# Patient Record
Sex: MALE | Race: WHITE
[De-identification: names, ages, dates, MRNs, and addresses within clinical notes are randomized per-mention and may not be internally consistent; named-entity substitution may affect disease eponyms.]

---

## 2018-09-30 ENCOUNTER — HOSPITAL ENCOUNTER (EMERGENCY)
Dept: HOSPITAL 41 - JD.ED | Age: 38
Discharge: HOME | End: 2018-09-30
Payer: COMMERCIAL

## 2018-09-30 VITALS — DIASTOLIC BLOOD PRESSURE: 108 MMHG | SYSTOLIC BLOOD PRESSURE: 139 MMHG

## 2018-09-30 DIAGNOSIS — I10: ICD-10-CM

## 2018-09-30 DIAGNOSIS — F17.210: ICD-10-CM

## 2018-09-30 DIAGNOSIS — F41.9: ICD-10-CM

## 2018-09-30 DIAGNOSIS — F32.9: ICD-10-CM

## 2018-09-30 DIAGNOSIS — M51.37: ICD-10-CM

## 2018-09-30 DIAGNOSIS — Z88.1: ICD-10-CM

## 2018-09-30 DIAGNOSIS — M47.26: Primary | ICD-10-CM

## 2018-09-30 PROCEDURE — 72131 CT LUMBAR SPINE W/O DYE: CPT

## 2018-09-30 PROCEDURE — 99284 EMERGENCY DEPT VISIT MOD MDM: CPT

## 2018-09-30 PROCEDURE — 96361 HYDRATE IV INFUSION ADD-ON: CPT

## 2018-09-30 PROCEDURE — 96375 TX/PRO/DX INJ NEW DRUG ADDON: CPT

## 2018-09-30 PROCEDURE — 96374 THER/PROPH/DIAG INJ IV PUSH: CPT
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
## 2018-10-01 NOTE — CT
CT lumbar spine

 

Technique: Multiple axial sections were obtained from the mid disc 

level of T11-T12 inferiorly to the bottom of the S3 sacral segment.  

Reconstructed sagittal and coronal images were reviewed.

 

Comparison: No prior lumbar spine imaging.

 

Findings: Nonobstructing calculus is partially seen within the left 

kidney.  Liver shows fatty infiltration.

 

T11-T12: Disc is incompletely seen.  Posterior disc maintains a 

concave margin.  No central canal stenosis or neural foraminal 

stenosis is seen.

 

T12-L1: Posterior disc maintains a concave margin.  No central canal 

stenosis or neural foraminal stenosis is seen.

 

L1-L2: Posterior disc is preserved.  No central canal stenosis or 

neural foraminal stenosis is seen.

 

L2-L3: Posterior disc maintains a concave margin.  No central canal 

stenosis or neural foraminal stenosis is seen.

 

L3-L4: Posterior disc maintains a concave margin.  No central canal 

stenosis or neural foraminal stenosis is seen.

 

L4-L5: Minimal circumferential disc bulge is seen.  Posterior disc 

maintains a planar margin.  No central canal stenosis or neural 

foraminal stenosis is seen.

 

L5-S1: Mild posterior disc bulge is seen asymmetric to the right side.

  Disc bulge slightly touches the right S1 nerve root.  Neural 

foramina are patent where the nerve roots exit.  Minimal degenerative 

apophyseal change is seen.

 

No fracture or abnormal subluxation is seen.

 

Impression:

1.  Slight degenerative change as noted above most prominent at L5-S1 

with minimal disc bulge slightly touching the right S1 nerve root.

2.  Nothing acute is seen.

3.  Other incidental findings as noted above.

 

Diagnostic code #3

 

I agree with preliminary report issued by [facility] ([facility] report finalized 

on 09/30/18, 10:17 PM Central Time)
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
## 2020-04-19 NOTE — US
Abdominal ultrasound: Multiple real-time images of the abdomen were 

obtained.

 

Liver is generous in size and appears echogenic most likely due to 

fatty infiltration.  No definite focal abnormality is appreciated 

within the liver.  Gallbladder is filled with sludge.  No shadowing 

gallstones, gallbladder wall thickening or biliary duct dilatation is 

seen.  Pancreas appears to be a scattered in echogenicity.  Possible 

hypoechoic mass within the body of the pancreas is noted measuring 2.2

 cm.  No additional abnormalities are appreciated within the 

visualized pancreas.  Kidneys show no hydronephrosis or mass.  Right 

kidney length is 11.4 cm and left kidney length is 12.3 cm.  Aorta 

shows no aneurysm.  Inferior vena cava not visualized due to bowel 

gas.  Spleen size is normal.  Portal vein shows normal hepatopedal 

flow.

 

Impression:

1.  Abnormal pancreas suggesting the possibility of chronic 

pancreatitis.  Questionable mass within the mid pancreas measuring 2.2

 cm.  3 phase contrast enhanced CT of the pancreas is recommended to 

further evaluate.

2.  Sludge within the gallbladder.

3.  Fatty infiltration within the liver with mild hepatomegaly.

 

Diagnostic code #9

 

This report was dictated in MDT

 

I agree with preliminary report from [facility], finalized on 04/18/20, 8:02

 PM [employer] Daylight Time

## 2020-04-19 NOTE — PCM.PN
- General Info


Date of Service: 04/19/20


Subjective Update: 





Feeling ok 


Slept ok


Thirsty


No pain








- Patient Data


Vitals - Most Recent: 


 Last Vital Signs











Temp  98.2 F   04/19/20 07:38


 


Pulse  109 H  04/19/20 07:38


 


Resp  14   04/19/20 07:38


 


BP  114/84   04/19/20 07:38


 


Pulse Ox  98   04/19/20 07:38











Weight - Most Recent: 70.942 kg





- Exam


Quality Assessment: No: Supplemental Oxygen, Central Line/PICC, Urine Catheter


General: Alert, Oriented, Cooperative.  No: No Acute Distress


HEENT: Pupils Equal, Pupils Reactive, Scleral Icterus


Neck: Supple, Trachea Midline, No JVD, No Thyromegaly, +2 Carotid Pulse wo Bruit


Lungs: Clear to Auscultation, Normal Respiratory Effort.  No: Decreased Breath 

Sounds, Crackles, Rales, Rhonchi, Rub, Wheezing


Cardiovascular: Regular Rate, Regular Rhythm.  No: Murmurs, Gallops, Rubs


GI/Abdominal Exam: Normal Bowel Sounds, Mass, Hepatomegaly (appear to extend 

under umbilicus, about 5cm below rib cage).  No: Distended, Guarding, Rigid


Skin: Warm, Dry, Intact





Sepsis Event Note





- Evaluation


Sepsis Screening Result: Severe Sepsis Risk





- Focused Exam


Vital Signs: 


 Vital Signs











  Temp Pulse Resp BP Pulse Ox


 


 04/19/20 07:38  98.2 F  109 H  14  114/84  98


 


 04/19/20 04:44  98.2 F  104 H  20  120/88  98











Date Exam was Performed: 04/19/20


Time Exam was Performed: 17:54





- Problem List & Annotations


(1) Alcoholic hepatitis


SNOMED Code(s): 875307201


   Code(s): K70.10 - ALCOHOLIC HEPATITIS WITHOUT ASCITES   Status: Acute   

Current Visit: Yes   





(2) Hepatomegaly


SNOMED Code(s): 27809207


   Code(s): R16.0 - HEPATOMEGALY, NOT ELSEWHERE CLASSIFIED   Status: Acute   

Current Visit: Yes   





(3) Macrocytic anemia


SNOMED Code(s): 66347387


   Code(s): D53.9 - NUTRITIONAL ANEMIA, UNSPECIFIED   Status: Acute   Current 

Visit: Yes   





(4) Thrombocytopenia


SNOMED Code(s): 738408356


   Code(s): D69.6 - THROMBOCYTOPENIA, UNSPECIFIED   Status: Acute   Current 

Visit: Yes   





(5) Hypokalemia


SNOMED Code(s): 95257603


   Code(s): E87.6 - HYPOKALEMIA   Status: Acute   Current Visit: Yes   





(6) Hypoalbuminemia


SNOMED Code(s): 179452023


   Code(s): E88.09 - [facility] DISORDERS OF PLASMA-PROTEIN METABOLISM, NEC   Status: 

Acute   Current Visit: Yes   





(7) Alcohol abuse


SNOMED Code(s): 29244460


   Code(s): F10.10 - ALCOHOL ABUSE, UNCOMPLICATED   Status: Acute   Current 

Visit: Yes   





(8) Hyponatremia


SNOMED Code(s): 40322848


   Code(s): E87.1 - HYPO-OSMOLALITY AND HYPONATREMIA   Status: Acute   Current 

Visit: Yes   





(9) Anxiety


SNOMED Code(s): 23971854


   Code(s): F41.9 - ANXIETY DISORDER, UNSPECIFIED   Status: Acute   Current 

Visit: No   





(10) Depression


SNOMED Code(s): 62585764


   Code(s): F32.9 - MAJOR DEPRESSIVE DISORDER, SINGLE EPISODE, UNSPECIFIED   

Status: Acute   Current Visit: No   





(11) Hypomagnesemia


SNOMED Code(s): 352581091


   Code(s): E83.42 - HYPOMAGNESEMIA   Status: Acute   Current Visit: Yes   





(12) Current every day smoker


SNOMED Code(s): 493555664, 963360373


   Code(s): F17.200 - NICOTINE DEPENDENCE, UNSPECIFIED, UNCOMPLICATED   Status: 

Acute   Current Visit: Yes   





(13) Pancreatic mass


SNOMED Code(s): 525500931


   Code(s): K86.89 - OTHER SPECIFIED DISEASES OF PANCREAS   Status: Acute   

Current Visit: Yes   





(14) Hypophosphatemia


SNOMED Code(s): 5790579


   Code(s): E83.39 - OTHER DISORDERS OF PHOSPHORUS METABOLISM   Status: Acute   

Current Visit: Yes   





(15) Marijuana smoker


SNOMED Code(s): 784673136


   Code(s): F12.90 - CANNABIS USE, UNSPECIFIED, UNCOMPLICATED   Status: Acute   

Current Visit: Yes   





- Problem List Review


Problem List Initiated/Reviewed/Updated: Yes





- Plan


Plan:: 





DAY OF ADMISSION


- Ethanol level zero on admission + binge alcohol drinking pattern


   - Admitted with [facility] protocol


- No need for steroid use due to Maddrey score of 8


- Negative Tylenol and salicylate level


- Positive UDS for THC


- Normal coagulation studies, no need for vitamin K


- Normal kidney function


- Smoked 0.5ppd


- Depression


   - Worsening depression as per patient


   - Previously treated with paroxetine, he discontinued due to sexual 

dysfunction


   - Denies any suicidal or homicidal ideation


   - Denies any previous psychiatric admissions





DAY 1


- Evaluated by psychiatry 


   - Recommended initiating Seroquel, Topamax, Prozac and Ativan as needed


   - Outpatient rehabilitation


- Complete abdominal US with mas in pancreatic head recommending triple phase 

CT 


- Electrolytes continue to be abnormal, will replace


- Liver function tests still significantly elevate but trending down


- Patient remains NPO


- Afebrile


- CT imaging with pancreatic head mass possibly in ampulla of vater


Case discussed with [facility] in [place], [place]--> 


States that the findings on pancreatic head appear to be same density of the 

rest of the pancreas and does not appear to be a mas


There is some stranding around the pancreas which represents acute edema and 

stated patient likely is having acute pancreatitis without pain


Recommended outpatient endoscopic ultrasound 4 weeks after resolution of 

current clinical picture





Acute alcoholic hepatitis


Alcohol abuse, binge pattern


Hepatomegaly


- NS + 40KCl


- Repeat labs in AM


- Strict urine output monitoring


- Thiamine and folic acid supplementation


- Banana bag in AM


- CIWA protocol


- Hepatitis panel, pending


- Continue NPO status


- Daily lipase and amylase





Hypokalemia


Hypophosphatemia


Hyponatremia


- KCl 40mEq IV


- NS + 40mEq at 150 ml/hr


- Sodium phosphate 60mMol 





Current every day smoker


- Nicotine patch 14mg





Depression


- Start thiamine, folic acid


- Start Seroquel


- Start Topamax


- Start Prozac





Hypoalbuminemia


Weight loss


- Dietary consult





Hypomagnesemia, resolved





PROPHYLAXIS


DVT- compression stockings


GI- pantoprazole





CODE STATUS: FULL CODE





DISPOSITION:


Patient will be admitted for supportive care and monitorization of liver 

function. 





SOCIAL:


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

## 2020-04-20 NOTE — CONS
CONSULTING PHYSICIAN:  Saad Francisco MD

DATE OF CONSULTATION:  04/19/2020

 

Site where the services are provided are [facility] in [place], [place].

 

Site where the services are provided are offices in [facility].

 

Length of service for this 60-minute inpatient telemedicine event is 60 minutes.

 

IDENTIFICATION:

The patient is a 39-year-old male who is admitted to the inpatient Med/Surg Unit

at [facility] in [place], [place].  He is seen for

psychiatric consultation per the request of staff attending, Dr. Lyles, in

our treatment team.

 

CHIEF COMPLAINT:

"I wanna lyons and drink like a fish.  I don't know why the hell I do drink

sometimes."

 

HISTORY OF PRESENT ILLNESS:

The patient is a 39-year-old male who is admitted to the inpatient med/surg unit

for complications of hepatitis caused by excessive alcohol use.  He states that

he has a history of binge drinking, and he states that for the last 6 weeks, he

has been drinking "about 10 to 12 shots a day" of hard liquor.  He states he has

been doing that again for 6 weeks and he states that he had been doing well,

"but it all started when I stopped taking my paroxetine" back in September.  The

patient states that he had been on the Paxil medication for about 6 months.  "I

had not been drinking", but he stopped the medication because he was having some

sexual side effects from the medication.  He states also "I'm kind of quick

tempered" and reports mood swings.  He states the Paxil helped a little bit with

that too, but since he stopped the Paxil medication, started to get pretty

depressed, pretty irritable, and he has been drinking again pretty heavily.  He

states he has lack of interest, lack of sleep, where he only gets about 3 hours

of sleep per 24-hour period and "if I'm christie, maybe 4 hours."  Reports lack of

appetite.  He states that he has racing thoughts, ruminations, worries,

"constantly worrying," and just feels pretty anxious too.  He is denying any

suicide or homicide.  He denies any psychotic, delusional, or paranoid symptoms.

He denies any illicit substance use complicating his clinical picture.  He

states that he wants to stop drinking and he states "I just need to get on a

medication again" to help him with his mood.  He does report decent energy

levels in general.

 

MEDICATIONS:

At time of presentation, none.

 

ALLERGIES:

Cephalexin.

 

PAST MEDICAL HISTORY:

1. Lower back pain.

2. History of alcoholic hepatitis.

 

REVIEW OF SYSTEMS:

Aside from musculoskeletal and hepatic, all other major organ systems are

negative at this point in time for acute difficulties or complications.

 

FAMILY PSYCHIATRIC HISTORY:

The patient reports sister who has a history of alcoholism.

 

PAST PSYCHIATRIC AND CD HISTORY:

The patient denies any previous psychiatric hospitalizations.  Reports one

chemical dependency treatment in the past.  He has not been to AA.  He states

his longest sobriety has been for 1 year.  He denies any previous suicide

attempts, self-injurious behaviors, or eating disorder history.

 

PAST PSYCHIATRIC DIAGNOSIS:

Includes clinical depression.

 

PAST PSYCHIATRIC MEDICATION HISTORY:

Includes Paxil.  He was taking 20 mg for 6 months at work, but he quit secondary

to sexual side effects, and he also had Ativan in the past which helped him.

The patient sees a neurologist out [facility] named Dr. Thrasher.

 

SOCIAL HISTORY:

The patient was born in [place], [place].  Raised in [place], [place].  He is the 4th of 4 siblings and 3 older sisters.  The patient's parents

were  throughout his childhood and adolescence.  Father was a johnson and

a rancher.  Mother worked on a ranch and stayed home with the family.  The

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

also doing farming and ranching.  He has been  once,  once, is in

a current relationship for about 5 years.  He has a 2-year-old child from the

relationship and 4 stepchildren.  His girlfriend works in a truck shop.  He

lives in [place], [place], with his girlfriend and children.  Denies any

prior  service or any current legal difficulties.  He is Evangelical in

terms of his danilo formation.  However, he is not feeling so depressed.  He

enjoys playing golf, hunting, fishing, and camping.

 

MENTAL STATUS EXAM:

The patient is a 39-year-old white male in no apparent distress.  Speech is of

regular rate and rhythm.  The patient is cognitively oriented.  Psychomotor

activity is within normal limits.  There are no abnormal motor movements or tics

observed.  Gait and station are not observed.  This patient is lying in bed

during the course of the inpatient consult.  Mood is depressed and anxious.

Affect is consistent with stated mood, restricted, but cooperative overall for

the purposes of the inpatient consult.  There is no behavioral or stated

evidence of acute suicidal or homicidal ideation or acute psychotic, delusional,

or paranoid symptoms.  Thought processes are significant for racing thoughts or

ruminations.  There are no manic symptoms, loose associations evident.  Judgment

and insight appear unimpaired at this point in time.  Motivation for help

appears good.

 

VITAL SIGNS:  114/84, 109, 14, 98.2 degrees.

 

IMPRESSION:

Axis I:

1. Alcohol dependence, F10.20.

2. Bipolar affective disease, mixed type, F31.60.

3. Anxiety disorder, not otherwise specified, F41.9.

4. Rule out major depressive disorder.

Axis II:  None.

Axis III:

1. History of alcoholic hepatitis.

2. History of lower back pain.

Axis IV:  Severe.

Axis V:  55.

 

PLAN:

1. Sobriety.

2. AA rep to visit the patient while on unit.

3. Pastoral guidance.

4. Begin trial of Seroquel 25 mg at bedtime to help with clarity of thought,

    mood stability, and reduction of racing thoughts, ruminations, as well as

    sleep initiation and maintenance, anxiety reduction, and elimination of any

    psychotic symptoms.

5. Begin Topamax 25 mg b.i.d. for mood stability, anxiety reduction, and

    seizure prophylaxis.

6. Begin Prozac 20 mg q.a.m. for symptoms of depression.

7. Folic acid supplementation while on unit.

8. Thiamine supplementation while on unit.

9. Ativan per [facility] protocol.

10.Recommend that the patient follow up with Outpatient Psychiatry when

    medically stabilized and discharged back to community to assess his overall

    function and efficacy of his newly initiated psychiatric medication

    regimen.

11.The patient is apprised of benefits and side effects of his newly initiated

    psychiatric medication regimen.  He acknowledges understanding of these

    facts.  He has no further questions by the end of the interview session.

12.The patient is unable to maintain sobriety on his own after being

    discharged back to the community.  Would recommend some type of structured

    chemical dependency treatment program, either on outpatient or inpatient

    basis moving forward.

13.We will continue to follow up with the patient on an as-needed basis while

    he remains on the inpatient med/surg unit at [facility] in

    [place], [place].

14.We will follow up with the patient sooner if any complications in the

    interim.

15.Crisis plan is in place.

 

DD:  04/20/2020 06:58:05

DT:  04/20/2020 08:30:33  NHAN

Job #:  [id number]

## 2020-04-20 NOTE — PCM.PN
- General Info


Date of Service: 04/20/20


Subjective Update: 





Feeling OK


Slept OK


No pain 


No complaints








- Patient Data


Vitals - Most Recent: 


 Last Vital Signs











Temp  98.1 F   04/20/20 11:35


 


Pulse  105 H  04/20/20 16:09


 


Resp  16   04/20/20 16:09


 


BP  126/92 H  04/20/20 16:09


 


Pulse Ox  97   04/20/20 16:09











Weight - Most Recent: 72.756 kg





- Exam


Physical Findings Comments:: 





Quality Assessment: No: Supplemental Oxygen, Central Line/PICC, Urine Catheter


General: Alert, Oriented, Cooperative.  No: No Acute Distress


HEENT: Pupils Equal, Pupils Reactive, Scleral Icterus


Neck: Supple, Trachea Midline, No JVD, No Thyromegaly, +2 Carotid Pulse wo Bruit


Lungs: Clear to Auscultation, Normal Respiratory Effort.  No: Decreased Breath 

Sounds, Crackles, Rales, Rhonchi, Rub, Wheezing


Cardiovascular: Regular Rate, Regular Rhythm.  No: Murmurs, Gallops, Rubs


GI/Abdominal Exam: Normal Bowel Sounds, Mass, Hepatomegaly (appear to extend 

under umbilicus, about 5cm below rib cage).  No: Distended, Guarding, Rigid


Skin: Warm, Dry, Intact





Sepsis Event Note





- Evaluation


Sepsis Screening Result: No Definite Risk





- Focused Exam


Vital Signs: 


 Vital Signs











  Temp Pulse Resp BP Pulse Ox


 


 04/20/20 16:09   105 H  16  126/92 H  97


 


 04/20/20 11:35  98.1 F  103 H  16  129/91 H  98


 


 04/20/20 08:22  97.3 F  97  16  133/91 H  98











Date Exam was Performed: 04/21/20


Time Exam was Performed: 11:57





- Problem List & Annotations


(1) Alcoholic hepatitis


SNOMED Code(s): 877580740


   Code(s): K70.10 - ALCOHOLIC HEPATITIS WITHOUT ASCITES   Status: Acute   

Current Visit: Yes   





(2) Hepatomegaly


SNOMED Code(s): 09430307


   Code(s): R16.0 - HEPATOMEGALY, NOT ELSEWHERE CLASSIFIED   Status: Acute   

Current Visit: Yes   





(3) Macrocytic anemia


SNOMED Code(s): 95426128


   Code(s): D53.9 - NUTRITIONAL ANEMIA, UNSPECIFIED   Status: Acute   Current 

Visit: Yes   





(4) Thrombocytopenia


SNOMED Code(s): 997841085


   Code(s): D69.6 - THROMBOCYTOPENIA, UNSPECIFIED   Status: Acute   Current 

Visit: Yes   





(5) Hypokalemia


SNOMED Code(s): 21613775


   Code(s): E87.6 - HYPOKALEMIA   Status: Acute   Current Visit: Yes   





(6) Hypoalbuminemia


SNOMED Code(s): 372321365


   Code(s): E88.09 - OTH DISORDERS OF PLASMA-PROTEIN METABOLISM, NEC   Status: 

Acute   Current Visit: Yes   





(7) Alcohol abuse


SNOMED Code(s): 51240276


   Code(s): F10.10 - ALCOHOL ABUSE, UNCOMPLICATED   Status: Acute   Current 

Visit: Yes   





(8) Hyponatremia


SNOMED Code(s): 88021324


   Code(s): E87.1 - HYPO-OSMOLALITY AND HYPONATREMIA   Status: Acute   Current 

Visit: Yes   





(9) Anxiety


SNOMED Code(s): 90998672


   Code(s): F41.9 - ANXIETY DISORDER, UNSPECIFIED   Status: Acute   Current 

Visit: No   





(10) Depression


SNOMED Code(s): 28191422


   Code(s): F32.9 - MAJOR DEPRESSIVE DISORDER, SINGLE EPISODE, UNSPECIFIED   

Status: Acute   Current Visit: No   





(11) Hypomagnesemia


SNOMED Code(s): 311399825


   Code(s): E83.42 - HYPOMAGNESEMIA   Status: Acute   Current Visit: Yes   





(12) Current every day smoker


SNOMED Code(s): 629335754, 058438631


   Code(s): F17.200 - NICOTINE DEPENDENCE, UNSPECIFIED, UNCOMPLICATED   Status: 

Acute   Current Visit: Yes   





(13) Pancreatic mass


SNOMED Code(s): 882886778


   Code(s): K86.89 - OTHER SPECIFIED DISEASES OF PANCREAS   Status: Acute   

Current Visit: Yes   





(14) Hypophosphatemia


SNOMED Code(s): 8233730


   Code(s): E83.39 - OTHER DISORDERS OF PHOSPHORUS METABOLISM   Status: Acute   

Current Visit: Yes   





(15) Marijuana smoker


SNOMED Code(s): 261520436


   Code(s): F12.90 - CANNABIS USE, UNSPECIFIED, UNCOMPLICATED   Status: Acute   

Current Visit: Yes   





- Problem List Review


Problem List Initiated/Reviewed/Updated: Yes





- Plan


Plan:: 





DAY OF ADMISSION


- Ethanol level zero on admission + binge alcohol drinking pattern


   - Admitted with CIWA protocol


- No need for steroid use due to Maddrey score of 8


- Negative Tylenol and salicylate level


- Positive UDS for THC


- Normal coagulation studies, no need for vitamin K


- Normal kidney function


- Smoked 0.5ppd


- Depression


   - Worsening depression as per patient


   - Previously treated with paroxetine, he discontinued due to sexual 

dysfunction


   - Denies any suicidal or homicidal ideation


   - Denies any previous psychiatric admissions





DAY 1


- Evaluated by psychiatry 


   - Recommended initiating Seroquel, Topamax, Prozac and Ativan as needed


   - Outpatient rehabilitation


- Complete abdominal US with mas in pancreatic head recommending triple phase 

CT 


- Electrolytes continue to be abnormal, will replace


- Liver function tests still significantly elevate but trending down


- Patient remains NPO


- Afebrile


- CT imaging with pancreatic head mass possibly in ampulla of vater


Case discussed with [facility] in [place], [place]--> 


States that the findings on pancreatic head appear to be same density of the 

rest of the pancreas and does not appear to be a mas


There is some stranding around the pancreas which represents acute edema and 

stated patient likely is having acute pancreatitis without pain


Recommended outpatient endoscopic ultrasound 4 weeks after resolution of 

current clinical picture





DAY 2


- LFTs trending down


- Pancreatic enzymes trending down


- No pain


- Is hungry


- Afebrile


- CIWA negative





Acute alcoholic hepatitis


Alcohol abuse, binge pattern


Hepatomegaly


- NS + 40KCl


- Repeat labs in AM


- Strict urine output monitoring


- Thiamine and folic acid supplementation


- [facility] protocol


- Hepatitis panel, pending


- Continue NPO status


- Daily lipase and amylase





Hypokalemia


Hypophosphatemia


Hyponatremia


- KCl 40mEq IV


- NS + 40mEq at 150 ml/hr


- Sodium phosphate 60mMol 





Current every day smoker


- Nicotine patch 14mg





Depression


- Continue Seroquel, Topamax and Prozac





Hypoalbuminemia


Weight loss


- Dietary consult





Hypomagnesemia, resolved





PROPHYLAXIS


DVT- compression stockings


GI- pantoprazole





CODE STATUS: FULL CODE





DISPOSITION:


Patient will remain admitted for supportive care and monitorization of liver 

function. 


Diet will be started once enzymes are less than x3 upper limit of normal


Will need discharge to rehab facility





SOCIAL:


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

## 2020-04-20 NOTE — CT
CT abdomen and pelvis



Technique: Multiple axial sections were obtained as a 3 phase study through the 
pancreas.  Noncontrast imaging was obtained as well as arterial phase imaging 
and venous phase imaging through the pancreas.  5 minute delayed images were 
also obtained through the pancreas.



Findings: Liver is enlarged and shows diffuse fatty infiltration.



Visualized lung bases show nothing acute.



Adrenal glands show no nodule.  Kidneys show symmetric contrast enhancement 
without hydronephrosis.  No mass is appreciated within the kidneys.  5 minute 
delayed images show contrast excretion into both ureters which show no 
dilatation.  There is mild bowel wall thickening within portions of the colon.  
Slight inflammatory change is noted around the hepatic flexure of the colon.  
Findings are suspicious for nonspecific colitis.



Pancreas shows no abnormal calcifications.  Size of the pancreas is within 
normal limits for age.  Focal area of poor enhancement on the arterial phase 
noted within the head and uncinate process of the pancreas.  This finding 
measures 2.1 cm.  This finding is near the ampulla of Vater.  No additional 
abnormality is seen within the pancreas.



Scattered small lymph nodes are seen within the retroperitoneum believed to be 
within normal limits.



Bone window settings were reviewed.  No acute osseous finding is appreciated.



Impression:

1.  Findings suspicious for nonspecific colitis as described above.

2.  Focal area of poor enhancement within the head and uncinate process of the 
pancreas.  This could represent focal area of pancreatitis as well as a small 
mass which occurs near the ampulla of Vater.  Mass is felt more likely.  
Recommend referral to a gastroenterologist for opinion whether biopsy by ERCP 
can be performed.  This finding would be difficult to biopsy by percutaneous 
technique.  Also, please correlate if patient has elevated amylase and lipase 
to indicate pancreatitis.

3.  Diffuse fatty infiltration within the liver with hepatomegaly.



Diagnostic code #9



This report was dictated in MDT

MTDD

## 2020-04-21 NOTE — PCM.SN
- Free Text/Narrative


Note: 





The patient is a 39-year-old gentleman who was admitted initially for acute 

alcohol hepatitis.  Physician was called to the patient's room secondary to his 

severe agitation, disorientation and hallucinations.  According to nursing 

staff the patient's hallucinations and agitation gotten worse over the past 

several hours.  He was initially placed back on CIWA protocol he had been given 

3 milligrams of Ativan.  The patient will also be continued on CIWA protocol.  

On examination the patient was disoriented to place and time and he was able to 

repeat his name.  Also, the patient was clearly hallucinating.  The patient was 

also continuing to try and get out of bed.  It was felt that the patient is at 

higher risk for seizure activity and unintentional self-harm requiring one-on-

one sitter and he was moved to intensive care unit.  The patient also had 

ordered to have 5 mg of Haldol IM to help with his agitation.  This felt that 

the patient's acute mental status changes representative of alcoholic 

encephalopathy.  He also be continued on IV fluids as well as thiamine and 

folate.





TeleHealth





- TeleHealth


Patient Service Facility: [facility]: [facility]


Informed Consent: 


Telemedicine Audio/Visual Informed Consent: The risks, benefits, and 

alternatives to the telehealth visit were explained to the patient and the 

patient consented to this modality of care.  The telehealth visit was carried 

out via a secure, web-based conferencing system.  This telemedicine service was 

a real-time, two-way interactive video and communication between the patient 

and the provider.  All the parties involved were identified and approved by the 

patient prior to the visit.  Any physical exam was assisted by the patient.  

Unless noted otherwise, the provider was located at their usual clinic location

, and the patient was at their place of residence.  Patient identity was 

confirmed by having the patient state their name and date of birth.  All 

communications with the patient (verbal, audiovisual, and written) were 

documented in the patients medical record per documentation standards.

## 2020-04-21 NOTE — PCM.PN
- General Info


Date of Service: 20


Subjective Update: 


39-year-old male admitted for alcoholic hepatitis became severely agitated, 

disoriented, and had hallucinations early this morning/late last night.  

Patient was given Ativan and Haldol.  Patient was transferred to the ICU for 

closer observation and one-on-one sitter.





Functional Status: Reports: Pain Controlled





- Review of Systems


General: Reports: Other (Patient is confused)





- Patient Data


Vitals - Most Recent: 


 Last Vital Signs











Temp  97.0 F   20 08:00


 


Pulse  98   20 08:00


 


Resp  20   20 08:00


 


BP  100/70   20 08:00


 


Pulse Ox  98   20 08:00











Weight - Most Recent: 165 lb 3.2 oz


I&O - Last 24 Hours: 


 Intake & Output











 20





 22:59 06:59 14:59


 


Intake Total 2595 363 


 


Output Total 1600 450 


 


Balance 995 -87 











Lab Results Last 24 Hours: 


 Laboratory Results - last 24 hr











  20 Range/Units





  18:06 05:00 05:01 


 


WBC    5.61  (4.23-9.07)  K/mm3


 


RBC    2.36 L  (4.63-6.08)  M/mm3


 


Hgb    8.9 L  (13.7-17.5)  gm/dl


 


Hct    27.5 L  (40.1-51.0)  %


 


MCV    116.5 H D  (79.0-92.2)  fl


 


MCH    37.7 H  (25.7-32.2)  pg


 


MCHC    32.4  (32.2-35.5)  g/dl


 


RDW Std Deviation    61.5 H  (35.1-43.9)  fL


 


Plt Count    76 L  (163-337)  K/mm3


 


MPV    12.1  (9.4-12.3)  fl


 


Neut % (Auto)    65.7  (34.0-67.9)  %


 


Lymph % (Auto)    17.8 L  (21.8-53.1)  %


 


Mono % (Auto)    13.4 H  (5.3-12.2)  %


 


Eos % (Auto)    1.4  (0.8-7.0)  


 


Baso % (Auto)    0.5  (0.1-1.2)  %


 


Neut # (Auto)    3.68  (1.78-5.38)  K/mm3


 


Lymph # (Auto)    1.00 L  (1.32-3.57)  K/mm3


 


Mono # (Auto)    0.75  (0.30-0.82)  K/mm3


 


Eos # (Auto)    0.08  (0.04-0.54)  K/mm3


 


Baso # (Auto)    0.03  (0.01-0.08)  K/mm3


 


Manual Slide Review    Abnormal smear  


 


Sodium   135 L   (136-145)  mEq/L


 


Potassium   4.8   (3.5-5.1)  mEq/L


 


Chloride   101   ()  mEq/L


 


Carbon Dioxide   20 L   (21-32)  mEq/L


 


Anion Gap   18.8 H   (5-15)  


 


BUN   4 L   (7-18)  mg/dL


 


Creatinine   0.3 L   (0.7-1.3)  mg/dL


 


Est Cr Clr Drug Dosing   353.85   mL/min


 


Estimated GFR (MDRD)   > 60   (>60)  mL/min


 


BUN/Creatinine Ratio   13.3 L   (14-18)  


 


Glucose   73 L   ()  mg/dL


 


Calcium   8.2 L   (8.5-10.1)  mg/dL


 


Phosphorus   0.9 L   (2.6-4.7)  mg/dL


 


Magnesium   1.7 L   (1.8-2.4)  mg/dl


 


Total Bilirubin   14.0 H   (0.2-1.0)  mg/dL


 


Direct Bilirubin   11.30 H   (0.0-0.2)  mg/dl


 


GGT   1655 H   (15-85)  U/L


 


AST   366 H   (15-37)  U/L


 


ALT   147 H   (16-63)  U/L


 


Alkaline Phosphatase   360 H   ()  U/L


 


Ammonia     (11-32)  umol/L


 


Total Protein   5.5 L   (6.4-8.2)  g/dl


 


Albumin   2.4 L   (3.4-5.0)  g/dl


 


Globulin   3.1   gm/dL


 


Albumin/Globulin Ratio   0.8 L   (1-2)  


 


Amylase   119 H   ()  U/L


 


Lipase   854 H   ()  U/L


 


Hepatitis A IgM Ab  Negative    (Negative)  


 


Hep Bs Antigen  Negative    (Negative)  


 


Hep B Core IgM Ab  Negative    (Negative)  


 


Hepatitis C Antibody  0.1    (0.0-0.9)  s/co ratio














  20 Range/Units





  06:50 


 


WBC   (4.23-9.07)  K/mm3


 


RBC   (4.63-6.08)  M/mm3


 


Hgb   (13.7-17.5)  gm/dl


 


Hct   (40.1-51.0)  %


 


MCV   (79.0-92.2)  fl


 


MCH   (25.7-32.2)  pg


 


MCHC   (32.2-35.5)  g/dl


 


RDW Std Deviation   (35.1-43.9)  fL


 


Plt Count   (163-337)  K/mm3


 


MPV   (9.4-12.3)  fl


 


Neut % (Auto)   (34.0-67.9)  %


 


Lymph % (Auto)   (21.8-53.1)  %


 


Mono % (Auto)   (5.3-12.2)  %


 


Eos % (Auto)   (0.8-7.0)  


 


Baso % (Auto)   (0.1-1.2)  %


 


Neut # (Auto)   (1.78-5.38)  K/mm3


 


Lymph # (Auto)   (1.32-3.57)  K/mm3


 


Mono # (Auto)   (0.30-0.82)  K/mm3


 


Eos # (Auto)   (0.04-0.54)  K/mm3


 


Baso # (Auto)   (0.01-0.08)  K/mm3


 


Manual Slide Review   


 


Sodium   (136-145)  mEq/L


 


Potassium   (3.5-5.1)  mEq/L


 


Chloride   ()  mEq/L


 


Carbon Dioxide   (21-32)  mEq/L


 


Anion Gap   (5-15)  


 


BUN   (7-18)  mg/dL


 


Creatinine   (0.7-1.3)  mg/dL


 


Est Cr Clr Drug Dosing   mL/min


 


Estimated GFR (MDRD)   (>60)  mL/min


 


BUN/Creatinine Ratio   (14-18)  


 


Glucose   ()  mg/dL


 


Calcium   (8.5-10.1)  mg/dL


 


Phosphorus   (2.6-4.7)  mg/dL


 


Magnesium   (1.8-2.4)  mg/dl


 


Total Bilirubin   (0.2-1.0)  mg/dL


 


Direct Bilirubin   (0.0-0.2)  mg/dl


 


GGT   (15-85)  U/L


 


AST   (15-37)  U/L


 


ALT   (16-63)  U/L


 


Alkaline Phosphatase   ()  U/L


 


Ammonia  28  (11-32)  umol/L


 


Total Protein   (6.4-8.2)  g/dl


 


Albumin   (3.4-5.0)  g/dl


 


Globulin   gm/dL


 


Albumin/Globulin Ratio   (1-2)  


 


Amylase   ()  U/L


 


Lipase   ()  U/L


 


Hepatitis A IgM Ab   (Negative)  


 


Hep Bs Antigen   (Negative)  


 


Hep B Core IgM Ab   (Negative)  


 


Hepatitis C Antibody   (0.0-0.9)  s/co ratio











Med Orders - Current: 


 Current Medications





Fluoxetine HCl (Prozac)  20 mg PO DAILY [facility]


   Last Admin: 20 08:45 Dose:  20 mg


Folic Acid (Folic Acid)  1 mg PO DAILY [facility]


   Last Admin: 20 08:45 Dose:  1 mg


Magnesium Sulfate 2 gm/ Premix  50 mls @ 25 mls/hr IV ONETIME ONE


   Stop: 20 10:29


   Last Admin: 20 09:01 Dose:  25 mls/hr


Sodium Chloride (Normal Saline)  1,000 mls @ 150 mls/hr IV ASDIRECTED [facility]


   Last Admin: 20 08:30 Dose:  150 mls/hr


Lorazepam (Ativan)  1 - 3 mg IV ASDIRECTED [facility]; Protocol


   Last Admin: 20 03:15 Dose:  2 mg


Lorazepam (Ativan)  1 - 3 mg PO ASDIRECTED [facility]; Protocol


   Last Admin: 20 01:07 Dose:  2 mg


Nicotine (Habitrol)  14 mg TRDERM DAILY [facility]


   Last Admin: 20 08:45 Dose:  14 mg


Ondansetron HCl (Zofran Odt)  4 mg PO Q6H PRN


   PRN Reason: nausea, able to take PO


Ondansetron HCl (Zofran)  4 mg IV Q6H PRN


   PRN Reason: Nausea/Vomiting


Thiamine HCl (Vitamin B-1)  100 mg PO DAILY [facility]


   Last Admin: 20 08:45 Dose:  100 mg





Discontinued Medications





Acetaminophen (Tylenol)  650 mg PO NOW ONE


   Stop: 20 16:52


   Last Admin: 20 17:54 Dose:  Not Given


Cyanocobalamin (Vitamin B12)  1,000 mcg IM ONETIME ONE


   Stop: 20 15:26


   Last Admin: 20 15:52 Dose:  1,000 mcg


Folic Acid (Folic Acid)  1 mg IV ONETIME ONE


   Stop: 20 15:26


Folic Acid (Folic Acid)  1 mg IV DAILY [facility]


   Last Admin: 20 09:44 Dose:  1 mg


Haloperidol Lactate (Haldol)  5 mg IM ONETIME ONE


   Stop: 20 02:16


   Last Admin: 20 02:23 Dose:  5 mg


Haloperidol Lactate (Haldol) Confirm Administered Dose 5 mg .ROUTE .STK-MED ONE


   Stop: 20 02:19


   Last Admin: 20 02:24 Dose:  Not Given


Lactated Ringer's (Ringers, Lactated)  1,000 mls @ 999 mls/hr IV .BOLUS ONE


   Stop: 20 16:25


   Last Admin: 20 15:42 Dose:  999 mls/hr


Lactated Ringer's (Ringers, Lactated)  1,000 mls @ 999 mls/hr IV .BOLUS ONE


   Stop: 20 16:27


   Last Admin: 20 16:44 Dose:  999 mls/hr


Potassium Chloride/Sodium Chloride (Normal Saline With 20 Meq Kcl)  1,000 mls @ 

150 mls/hr IV ASDIRECTED [facility]


   Last Admin: 20 17:34 Dose:  150 mls/hr


Sodium Chloride (Normal Saline)  1,000 mls @ 150 mls/hr IV ASDIRECTED [facility]


   Last Admin: 20 15:01 Dose:  150 mls/hr


Lactated Ringer's (Ringers, Lactated)  1,000 mls @ 250 mls/hr IV ASDIRECTED [facility]


Magnesium Sulfate 4 gm/ Premix  50 mls @ 12.5 mls/hr IV ONETIME ONE


   Stop: 20 22:52


   Last Admin: 20 20:32 Dose:  12.5 mls/hr


Potassium Chloride 10 meq/ (Premix)  100 mls @ 100 mls/hr IV Q1H [facility]


   Stop: 20 20:59


   Last Admin: 20 22:04 Dose:  100 mls/hr


Sodium Chloride (Normal Saline)  100 mls @ 60 mls/hr IV ASDIRECTED [facility]


   Stop: 20 16:00


   Last Admin: 20 11:49 Dose:  60 mls/hr


Potassium Chloride 10 meq/ (Premix)  100 mls @ 100 mls/hr IV Q1H [facility]


   Stop: 20 22:44


   Last Admin: 20 23:39 Dose:  100 mls/hr


Potassium Chloride/Sodium Chloride (Normal Saline With 40 Meq Kcl)  1,000 mls @ 

150 mls/hr IV ASDIRECTED [facility]


   Last Admin: 20 01:08 Dose:  150 mls/hr


Sodium Phosphate 60 mmole/ (Sodium Chloride)  270 mls @ 67 mls/hr IV ONETIME ONE


   Stop: 20 23:16


   Last Admin: 20 20:09 Dose:  67 mls/hr


Iopamidol (Isovue-370 (76%))  100 ml IVPUSH ONETIME ONE


   Stop: 20 11:45


   Last Admin: 20 11:49 Dose:  100 ml


Lorazepam (Ativan)  0 mg PO ASDIRECTED [facility]; Protocol


Lorazepam (Ativan)  0 mg IV ASDIRECTED [facility]; Protocol


Lorazepam (Ativan)  1 mg PO ONETIME ONE


   Stop: 20 14:30


   Last Admin: 20 15:00 Dose:  1 mg


Ondansetron HCl (Zofran)  4 mg IVPUSH ONETIME ONE


   Stop: 20 15:26


   Last Admin: 20 15:42 Dose:  4 mg


Quetiapine Fumarate (Seroquel)  25 mg PO BEDTIME [facility]


   Last Admin: 20 21:29 Dose:  25 mg


Sodium Chloride (Saline Flush)  10 ml FLUSH ONETIME ONE


   Stop: 20 11:45


   Last Admin: 20 11:49 Dose:  10 ml


Topiramate (Topamax)  25 mg PO BID [facility]


   Last Admin: 20 21:29 Dose:  25 mg











- Exam


General: Other (Patient is confused.  He knows place and person but not time or 

date.)


HEENT: Other (Left eye is stuck and matted.)


Lungs: Clear to Auscultation, Normal Respiratory Effort


Cardiovascular: Regular Rate, Regular Rhythm


GI/Abdominal Exam: Normal Bowel Sounds, Soft, Non-Tender, No Distention, No 

Abnormal Bruit, Hepatomegaly


Back Exam: Normal Inspection


Extremities: Normal Inspection, Non-Tender, No Pedal Edema, Normal Capillary 

Refill


Skin: Warm, Dry, Intact


Psy/Mental Status: Withdrawal Symptoms





Sepsis Event Note





- Evaluation


Sepsis Screening Result: No Definite Risk





- Focused Exam


Vital Signs: 


 Vital Signs











  Temp Temp Pulse Pulse Resp BP BP


 


 20 08:00   97.0 F   98  20   100/70


 


 20 04:00   98.4 F    21 H   128/93 H


 


 20 03:24   98.4 F   110 H  26 H   130/96 H


 


 20 23:07  97.7 F   103 H   16  131/95 H 














  Pulse Ox


 


 20 08:00  98


 


 20 04:00  96


 


 20 03:24  96


 


 20 23:07  95











Date Exam was Performed: 20


Time Exam was Performed: 12:24





- Problem List & Annotations


(1) Alcohol abuse


SNOMED Code(s): 70554064


   Code(s): F10.10 - ALCOHOL ABUSE, UNCOMPLICATED   Status: Acute   Current 

Visit: Yes   





(2) Alcoholic encephalopathy


SNOMED Code(s): 669750402


   Code(s): G31.2 - DEGENERATION OF NERVOUS SYSTEM DUE TO ALCOHOL; F10.20 - 

ALCOHOL DEPENDENCE, UNCOMPLICATED   Status: Acute   Current Visit: Yes   





(3) Alcoholic hepatitis


SNOMED Code(s): 700964883


   Code(s): K70.10 - ALCOHOLIC HEPATITIS WITHOUT ASCITES   Status: Acute   

Current Visit: Yes   





(4) Current every day smoker


SNOMED Code(s): 178322498, 745373501


   Code(s): F17.200 - NICOTINE DEPENDENCE, UNSPECIFIED, UNCOMPLICATED   Status: 

Acute   Current Visit: Yes   





(5) Hepatomegaly


SNOMED Code(s): 06401020


   Code(s): R16.0 - HEPATOMEGALY, NOT ELSEWHERE CLASSIFIED   Status: Acute   

Current Visit: Yes   





(6) Hypoalbuminemia


SNOMED Code(s): 005299944


   Code(s): E88.09 - OTH DISORDERS OF PLASMA-PROTEIN METABOLISM, NEC   Status: 

Acute   Current Visit: Yes   





(7) Hypomagnesemia


SNOMED Code(s): 547264447


   Code(s): E83.42 - HYPOMAGNESEMIA   Status: Acute   Current Visit: Yes   





(8) Hyponatremia


SNOMED Code(s): 42962645


   Code(s): E87.1 - HYPO-OSMOLALITY AND HYPONATREMIA   Status: Acute   Current 

Visit: Yes   





(9) Hypophosphatemia


SNOMED Code(s): 3062325


   Code(s): E83.39 - OTHER DISORDERS OF PHOSPHORUS METABOLISM   Status: Acute   

Current Visit: Yes   





- Problem List Review


Problem List Initiated/Reviewed/Updated: Yes





- My Orders


Last 24 Hours: 


My Active Orders





20 08:30


Magnesium Sulfate/Water [Magnesium Sulfate in Water Premix] 2 gm   Premix Bag 1 

bag IV ONETIME 


Sodium Chloride 0.9% [Normal Saline] 1,000 ml IV ASDIRECTED 





20 05:11


CBC WITH AUTO DIFF [HEME] AM 


CMP [COMPREHENSIVE METABOLIC PN,CMP] [CHEM] AM 


MAGNESIUM [CHEM] AM 


PHOSPHORUS [CHEM] AM 





20 05:11


CBC WITH AUTO DIFF [HEME] AM 


CMP [COMPREHENSIVE METABOLIC PN,CMP] [CHEM] AM 


MAGNESIUM [CHEM] AM 


PHOSPHORUS [CHEM] AM 





20 05:11


CBC WITH AUTO DIFF [HEME] AM 


CMP [COMPREHENSIVE METABOLIC PN,CMP] [CHEM] AM 


MAGNESIUM [CHEM] AM 


PHOSPHORUS [CHEM] AM 














- Plan


Plan:: 





DAY OF ADMISSION


- Ethanol level zero on admission + binge alcohol drinking pattern


   - Admitted with [facility] protocol


- No need for steroid use due to Maddrey score of 8


- Negative Tylenol and salicylate level


- Positive UDS for THC


- Normal coagulation studies, no need for vitamin K


- Normal kidney function


- Smoked 0.5ppd


- Depression


   - Worsening depression as per patient


   - Previously treated with paroxetine, he discontinued due to sexual 

dysfunction


   - Denies any suicidal or homicidal ideation


   - Denies any previous psychiatric admissions





DAY 1


- Evaluated by psychiatry 


   - Recommended initiating Seroquel, Topamax, Prozac and Ativan as needed


   - Outpatient rehabilitation


- Complete abdominal US with mas in pancreatic head recommending triple phase 

CT 


- Electrolytes continue to be abnormal, will replace


- Liver function tests still significantly elevate but trending down


- Patient remains NPO


- Afebrile


- CT imaging with pancreatic head mass possibly in ampulla of vater


Case discussed with [facility] in [place], [place]--> 


States that the findings on pancreatic head appear to be same density of the 

rest of the pancreas and does not appear to be a mas


There is some stranding around the pancreas which represents acute edema and 

stated patient likely is having acute pancreatitis without pain


Recommended outpatient endoscopic ultrasound 4 weeks after resolution of 

current clinical picture





Day 2


* Increasing confusion and hallucination overnight


* Required significant Ativan and 5 mg of Haldol due to hallucination, confusion

, and concern for self-harm.


* Transfer to ICU for close monitoring and one-on-one care.


* Lipase decreasin down to 854


* Amylase stable at 119


* Bilirubin increased to 14, liver enzymes decreased from admission


* PT and INR slightly elevated at 13.1 and 1.22 respectively


* Patient n.p.o. but will advance diet as tolerated once his mental status 

improves


* Ammonia level is normal at 28.  This makes it less likely to be hepatic 

encephalopathy.


* Phosphorus 0.9


* Alcoholic encephalopathy


* Left eye matted and conjunctiva is erythematous


Acute alcoholic hepatitis


Alcohol abuse, binge pattern


Hepatomegaly


- NS 


- Repeat labs in AM


- Strict urine output monitoring


- Thiamine and folic acid supplementation


- Banana bag in AM


- CIWA protocol


- Hepatitis panel, pending


- Continue NPO status.  Patient has decreasing lipase and no abdominal pain or 

tenderness.  Consider starting clear liquids once neurologically intact.


- Daily lipase and amylase





Hypokalemia -resolved


Hypophosphatemia


Hyponatremia -improved


-  ml/hr


- Sodium phosphate pharmacy to dose





Current every day smoker


- Nicotine patch 14mg





Depression


- Thiamine, folic acid


-Stop Seroquel.  Patient had significant confusion last night and Seroquel 

could exacerbate that.


-Stop Topamax.  Also stopped Topamax secondary to possibility of worsening 

confusion.


- Start Prozac





Hypoalbuminemia


Weight loss


- Dietary consult





Hypomagnesemia, resolved





Conjunctivitis, left


-Warm compresses 3 times daily


-Polytrim eyedrops 1 drop every 4 hours while awake





PROPHYLAXIS


DVT- compression stockings


GI- pantoprazole





CODE STATUS: FULL CODE





DISPOSITION:


Patient will be admitted for supportive care and monitorization of liver 

function. 





SOCIAL:


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





Ultrasound abdomen: 


1.  Abnormal pancreas suggesting the possibility of chronic pancreatitis.  

Questionable mass within the mid pancreas measuring 2.2 cm.  Three-phase 

contrast-enhanced CT of the pancreas is recommended to further evaluate.


2.  Sludge within the gallbladder.


3.  Fatty infiltration within the liver with mild hepatomegaly.





CT of the abdomen and pelvis with and without contrast: 


1.  Findings suspicious for nonspecific colitis as described above.


2.  Focal area of poor enhancement within the head and uncinate process of the 

pancreas.  This could represent a focal area of pancreatitis as well as a small 

mass which occurs near the ampulla of Vater.  Masses felt more likely.  

Recommend referral to gastroenterologist for opinion whether biopsy or ERCP can 

be performed.  This finding would be difficult to biopsy by percutaneous 

technique.  Also, please correlate if patient has elevated amylase and lipase 

to indicate pancreatitis.


3.  Diffuse fatty infiltration within the liver with hepatomegaly.

## 2020-04-22 NOTE — PCM.PN
- General Info


Date of Service: 20


Admission Dx/Problem (Free Text): 





Alcoholic Pancreatitis


Subjective Update: 





Agapito is alert and oriented today.  He states he does not remember much of what 

happened yesterday.  He states he is hungry and would like to get out of bed.


Functional Status: Reports: Pain Controlled





- Review of Systems


General: Reports: Weakness, Fatigue


HEENT: Denies: Eye Pain (discharge)


Pulmonary: Reports: No Symptoms


Cardiovascular: Reports: No Symptoms


Gastrointestinal: Reports: No Symptoms


Musculoskeletal: Reports: No Symptoms


Skin: Reports: No Symptoms


Neurological: Reports: Weakness





- Patient Data


Vitals - Most Recent: 


 Last Vital Signs











Temp  98.2 F   20 04:00


 


Pulse  112 H  20 04:00


 


Resp  20   20 04:00


 


BP  127/97 H  20 04:00


 


Pulse Ox  96   20 04:00











Weight - Most Recent: 164 lb 3.2 oz


I&O - Last 24 Hours: 


 Intake & Output











 20





 22:59 06:59 14:59


 


Intake Total 3083 3123 


 


Output Total 1025 875 


 


Balance 2050 2998 











Lab Results Last 24 Hours: 


 Laboratory Results - last 24 hr











  20 Range/Units





  16:37 19:50 04:50 


 


WBC     (4.23-9.07)  K/mm3


 


RBC     (4.63-6.08)  M/mm3


 


Hgb     (13.7-17.5)  gm/dl


 


Hct     (40.1-51.0)  %


 


MCV     (79.0-92.2)  fl


 


MCH     (25.7-32.2)  pg


 


MCHC     (32.2-35.5)  g/dl


 


RDW Std Deviation     (35.1-43.9)  fL


 


Plt Count     (163-337)  K/mm3


 


MPV     (9.4-12.3)  fl


 


Neut % (Auto)     


 


Lymph % (Auto)     


 


Mono % (Auto)     


 


Eos % (Auto)     


 


Baso % (Auto)     


 


Neut # (Auto)     


 


Lymph # (Auto)     


 


Mono # (Auto)     


 


Eos # (Auto)     


 


Baso # (Auto)     


 


Neutrophils % (Manual)     (40-60)  %


 


Band Neutrophils %     (0-10)  %


 


Lymphocytes % (Manual)     (20-40)  %


 


Atypical Lymphs %     %


 


Monocytes % (Manual)     (2-10)  %


 


Eosinophils % (Manual)     (0.8-7.0)  %


 


Basophils % (Manual)     (0.2-1.2)  


 


Nucleated RBCs     %


 


Manual Slide Review     


 


Platelet Estimate     


 


Polychromasia     


 


Anisocytosis     


 


Macrocytosis     


 


RBC Morph Comment     


 


Sodium    136  (136-145)  mEq/L


 


Potassium   3.8  3.5  (3.5-5.1)  mEq/L


 


Chloride    101  ()  mEq/L


 


Carbon Dioxide    23  (21-32)  mEq/L


 


Anion Gap    15.5 H  (5-15)  


 


BUN    2 L  (7-18)  mg/dL


 


Creatinine    0.5 L  (0.7-1.3)  mg/dL


 


Est Cr Clr Drug Dosing    211.03  mL/min


 


Estimated GFR (MDRD)    > 60  (>60)  mL/min


 


BUN/Creatinine Ratio    4.0 L  (14-18)  


 


Glucose    134 H  ()  mg/dL


 


POC Glucose  91    ()  mg/dL


 


Calcium    7.6 L  (8.5-10.1)  mg/dL


 


Phosphorus   1.8 L  2.1 L  (2.6-4.7)  mg/dL


 


Magnesium   1.7 L  2.3  (1.8-2.4)  mg/dl


 


Total Bilirubin    12.6 H  (0.2-1.0)  mg/dL


 


Direct Bilirubin    10.60 H  (0.0-0.2)  mg/dl


 


GGT    1519 H  (15-85)  U/L


 


AST    335 H  (15-37)  U/L


 


ALT    144 H  (16-63)  U/L


 


Alkaline Phosphatase    368 H  ()  U/L


 


Troponin I   < 0.017   (0.00-0.056)  ng/mL


 


Total Protein    5.4 L  (6.4-8.2)  g/dl


 


Albumin    2.2 L  (3.4-5.0)  g/dl


 


Globulin    3.2  gm/dL


 


Albumin/Globulin Ratio    0.7 L  (1-2)  


 


Amylase    105  ()  U/L


 


Lipase    1076 H  ()  U/L














  20 Range/Units





  04:50 


 


WBC  5.28  (4.23-9.07)  K/mm3


 


RBC  2.49 L  (4.63-6.08)  M/mm3


 


Hgb  9.3 L  (13.7-17.5)  gm/dl


 


Hct  28.7 L  (40.1-51.0)  %


 


MCV  115.3 H  (79.0-92.2)  fl


 


MCH  37.3 H  (25.7-32.2)  pg


 


MCHC  32.4  (32.2-35.5)  g/dl


 


RDW Std Deviation  62.3 H  (35.1-43.9)  fL


 


Plt Count  98 L  (163-337)  K/mm3


 


MPV  11.1  (9.4-12.3)  fl


 


Neut % (Auto)  Cancelled  


 


Lymph % (Auto)  Cancelled  


 


Mono % (Auto)  Cancelled  


 


Eos % (Auto)  Cancelled  


 


Baso % (Auto)  Cancelled  


 


Neut # (Auto)  Cancelled  


 


Lymph # (Auto)  Cancelled  


 


Mono # (Auto)  Cancelled  


 


Eos # (Auto)  Cancelled  


 


Baso # (Auto)  Cancelled  


 


Neutrophils % (Manual)  59  (40-60)  %


 


Band Neutrophils %  5  (0-10)  %


 


Lymphocytes % (Manual)  17 L  (20-40)  %


 


Atypical Lymphs %  0  %


 


Monocytes % (Manual)  17 H  (2-10)  %


 


Eosinophils % (Manual)  2  (0.8-7.0)  %


 


Basophils % (Manual)  0 L  (0.2-1.2)  


 


Nucleated RBCs  3.0  %


 


Manual Slide Review  Cancelled  


 


Platelet Estimate  Decreased  


 


Polychromasia  2+ moderate  


 


Anisocytosis  2+ moderate  


 


Macrocytosis  2+ moderate  


 


RBC Morph Comment  Not Reportable  


 


Sodium   (136-145)  mEq/L


 


Potassium   (3.5-5.1)  mEq/L


 


Chloride   ()  mEq/L


 


Carbon Dioxide   (21-32)  mEq/L


 


Anion Gap   (5-15)  


 


BUN   (7-18)  mg/dL


 


Creatinine   (0.7-1.3)  mg/dL


 


Est Cr Clr Drug Dosing   mL/min


 


Estimated GFR (MDRD)   (>60)  mL/min


 


BUN/Creatinine Ratio   (14-18)  


 


Glucose   ()  mg/dL


 


POC Glucose   ()  mg/dL


 


Calcium   (8.5-10.1)  mg/dL


 


Phosphorus   (2.6-4.7)  mg/dL


 


Magnesium   (1.8-2.4)  mg/dl


 


Total Bilirubin   (0.2-1.0)  mg/dL


 


Direct Bilirubin   (0.0-0.2)  mg/dl


 


GGT   (15-85)  U/L


 


AST   (15-37)  U/L


 


ALT   (16-63)  U/L


 


Alkaline Phosphatase   ()  U/L


 


Troponin I   (0.00-0.056)  ng/mL


 


Total Protein   (6.4-8.2)  g/dl


 


Albumin   (3.4-5.0)  g/dl


 


Globulin   gm/dL


 


Albumin/Globulin Ratio   (1-2)  


 


Amylase   ()  U/L


 


Lipase   ()  U/L











Med Orders - Current: 


 Current Medications





Fluoxetine HCl (Prozac)  20 mg PO DAILY [facility]


   Last Admin: 20 09:04 Dose:  20 mg


Folic Acid (Folic Acid)  1 mg PO DAILY [facility]


   Last Admin: 20 09:05 Dose:  1 mg


Dextrose/Sodium Chloride (Dextrose 5%-Normal Saline)  1,000 mls @ 125 mls/hr IV 

ASDIRECTED [facility]


   Last Admin: 20 04:52 Dose:  125 mls/hr


Lorazepam (Ativan)  1 - 3 mg IV ASDIRECTED [facility]; Protocol


   Last Admin: 20 03:15 Dose:  2 mg


Lorazepam (Ativan)  1 - 3 mg PO ASDIRECTED [facility]; Protocol


   Last Admin: 20 01:07 Dose:  2 mg


Nicotine (Habitrol)  14 mg TRDERM DAILY [facility]


   Last Admin: 20 09:05 Dose:  14 mg


Ondansetron HCl (Zofran Odt)  4 mg PO Q6H PRN


   PRN Reason: nausea, able to take PO


Ondansetron HCl (Zofran)  4 mg IV Q6H PRN


   PRN Reason: Nausea/Vomiting


Polymyxin/Trimethoprim Sulfate (Polytrim Ophth Soln)  1 ml EYEBOTH Q4HWA [facility]


   Last Admin: 20 09:05 Dose:  1 drop


Thiamine HCl (Vitamin B-1)  100 mg PO DAILY [facility]


   Last Admin: 20 09:04 Dose:  100 mg





Discontinued Medications





Acetaminophen (Tylenol)  650 mg PO NOW ONE


   Stop: 20 16:52


   Last Admin: 20 17:54 Dose:  Not Given


Cyanocobalamin (Vitamin B12)  1,000 mcg IM ONETIME ONE


   Stop: 20 15:26


   Last Admin: 20 15:52 Dose:  1,000 mcg


Folic Acid (Folic Acid)  1 mg IV ONETIME ONE


   Stop: 20 15:26


Folic Acid (Folic Acid)  1 mg IV DAILY [facility]


   Last Admin: 20 09:44 Dose:  1 mg


Haloperidol Lactate (Haldol)  5 mg IM ONETIME ONE


   Stop: 20 02:16


   Last Admin: 20 02:23 Dose:  5 mg


Haloperidol Lactate (Haldol) Confirm Administered Dose 5 mg .ROUTE .STK-MED ONE


   Stop: 20 02:19


   Last Admin: 20 02:24 Dose:  Not Given


Lactated Ringer's (Ringers, Lactated)  1,000 mls @ 999 mls/hr IV .BOLUS ONE


   Stop: 20 16:25


   Last Admin: 20 15:42 Dose:  999 mls/hr


Lactated Ringer's (Ringers, Lactated)  1,000 mls @ 999 mls/hr IV .BOLUS ONE


   Stop: 20 16:27


   Last Admin: 20 16:44 Dose:  999 mls/hr


Potassium Chloride/Sodium Chloride (Normal Saline With 20 Meq Kcl)  1,000 mls @ 

150 mls/hr IV ASDIRECTED [facility]


   Last Admin: 20 17:34 Dose:  150 mls/hr


Sodium Chloride (Normal Saline)  1,000 mls @ 150 mls/hr IV ASDIRECTED [facility]


   Last Admin: 20 15:01 Dose:  150 mls/hr


Lactated Ringer's (Ringers, Lactated)  1,000 mls @ 250 mls/hr IV ASDIRECTED [facility]


Magnesium Sulfate 4 gm/ Premix  50 mls @ 12.5 mls/hr IV ONETIME ONE


   Stop: 20 22:52


   Last Admin: 20 20:32 Dose:  12.5 mls/hr


Potassium Chloride 10 meq/ (Premix)  100 mls @ 100 mls/hr IV Q1H [facility]


   Stop: 20 20:59


   Last Admin: 20 22:04 Dose:  100 mls/hr


Sodium Chloride (Normal Saline)  100 mls @ 60 mls/hr IV ASDIRECTED [facility]


   Stop: 20 16:00


   Last Admin: 20 11:49 Dose:  60 mls/hr


Potassium Chloride 10 meq/ (Premix)  100 mls @ 100 mls/hr IV Q1H [facility]


   Stop: 20 22:44


   Last Admin: 20 23:39 Dose:  100 mls/hr


Potassium Chloride/Sodium Chloride (Normal Saline With 40 Meq Kcl)  1,000 mls @ 

150 mls/hr IV ASDIRECTED [facility]


   Last Admin: 20 01:08 Dose:  150 mls/hr


Sodium Phosphate 60 mmole/ (Sodium Chloride)  270 mls @ 67 mls/hr IV ONETIME ONE


   Stop: 20 23:16


   Last Admin: 20 20:09 Dose:  67 mls/hr


Magnesium Sulfate 2 gm/ Premix  50 mls @ 25 mls/hr IV ONETIME ONE


   Stop: 20 10:29


   Last Admin: 20 09:01 Dose:  25 mls/hr


Sodium Chloride (Normal Saline)  1,000 mls @ 150 mls/hr IV ASDIRECTED [facility]


   Stop: 20 15:00


   Last Admin: 20 08:30 Dose:  150 mls/hr


Sodium Phosphate 30 mmole/ (Sodium Chloride)  260 mls @ 130 mls/hr IV Q2H [facility]


   Stop: 20 14:59


   Last Admin: 20 13:04 Dose:  130 mls/hr


Magnesium Sulfate 2 gm/ Premix  50 mls @ 25 mls/hr IV Q1H [facility]


   Stop: 20 22:29


   Last Admin: 20 22:03 Dose:  25 mls/hr


Potassium Phosphate 15 mmole/ (Sodium Chloride)  255 mls @ 85 mls/hr IV 

ASDIRECTED [facility]


   Stop: 20 23:29


Sodium Phosphate 30 mmole/ (Sodium Chloride)  260 mls @ 86.667 mls/hr IV 

ONETIME ONE


   Stop: 20 00:29


   Last Admin: 20 21:35 Dose:  86.667 mls/hr


Iopamidol (Isovue-370 (76%))  100 ml IVPUSH ONETIME ONE


   Stop: 20 11:45


   Last Admin: 20 11:49 Dose:  100 ml


Lorazepam (Ativan)  0 mg PO ASDIRECTED [facility]; Protocol


Lorazepam (Ativan)  0 mg IV ASDIRECTED [facility]; Protocol


Lorazepam (Ativan)  1 mg PO ONETIME ONE


   Stop: 20 14:30


   Last Admin: 20 15:00 Dose:  1 mg


Ondansetron HCl (Zofran)  4 mg IVPUSH ONETIME ONE


   Stop: 20 15:26


   Last Admin: 20 15:42 Dose:  4 mg


Potassium Chloride (Klor-Con M20)  20 meq PO ONETIME ONE


   Stop: 20 21:08


   Last Admin: 20 21:34 Dose:  20 meq


Quetiapine Fumarate (Seroquel)  25 mg PO BEDTIME [facility]


   Last Admin: 20 21:29 Dose:  25 mg


Sodium Chloride (Saline Flush)  10 ml FLUSH ONETIME ONE


   Stop: 20 11:45


   Last Admin: 20 11:49 Dose:  10 ml


Topiramate (Topamax)  25 mg PO BID [facility]


   Last Admin: 20 21:29 Dose:  25 mg











- Exam


Quality Assessment: No: Supplemental Oxygen


General: Alert, Oriented


HEENT: Pupils Equal, Mucous Membr. Moist/Pink, Other (Left eye less matted and 

conjunctive are less red)


Neck: Supple


Lungs: Normal Respiratory Effort, Other (Coarse breath sounds)


Cardiovascular: Regular Rate, Regular Rhythm


GI/Abdominal Exam: Normal Bowel Sounds, Soft, Non-Tender, No Distention, 

Hepatomegaly


Extremities: Normal Inspection, Normal Range of Motion, Non-Tender, No Pedal 

Edema, Normal Capillary Refill


Skin: Warm, Dry, Intact


Psy/Mental Status: Alert, Normal Affect, Normal Mood





Sepsis Event Note





- Evaluation


Sepsis Screening Result: No Definite Risk





- Focused Exam


Vital Signs: 


 Vital Signs











  Temp Pulse Resp BP Pulse Ox


 


 20 04:00  98.2 F  112 H  20  127/97 H  96


 


 20 00:00  98.4 F  96  20  121/98 H  97











Date Exam was Performed: 20


Time Exam was Performed: 12:06





- Problem List & Annotations


(1) Alcohol abuse


SNOMED Code(s): 46213866


   Code(s): F10.10 - ALCOHOL ABUSE, UNCOMPLICATED   Status: Acute   Current 

Visit: Yes   





(2) Alcoholic encephalopathy


SNOMED Code(s): 471465343


   Code(s): G31.2 - DEGENERATION OF NERVOUS SYSTEM DUE TO ALCOHOL; F10.20 - 

ALCOHOL DEPENDENCE, UNCOMPLICATED   Status: Acute   Current Visit: Yes   





(3) Alcoholic hepatitis


SNOMED Code(s): 236880714


   Code(s): K70.10 - ALCOHOLIC HEPATITIS WITHOUT ASCITES   Status: Acute   

Current Visit: Yes   





(4) Current every day smoker


SNOMED Code(s): 964114328, 318022934


   Code(s): F17.200 - NICOTINE DEPENDENCE, UNSPECIFIED, UNCOMPLICATED   Status: 

Acute   Current Visit: Yes   





(5) Hepatomegaly


SNOMED Code(s): 85489626


   Code(s): R16.0 - HEPATOMEGALY, NOT ELSEWHERE CLASSIFIED   Status: Acute   

Current Visit: Yes   





(6) Hypoalbuminemia


SNOMED Code(s): 766769656


   Code(s): E88.09 - OTH DISORDERS OF PLASMA-PROTEIN METABOLISM, NEC   Status: 

Acute   Current Visit: Yes   





(7) Hypomagnesemia


SNOMED Code(s): 334000777


   Code(s): E83.42 - HYPOMAGNESEMIA   Status: Acute   Current Visit: Yes   





(8) Hyponatremia


SNOMED Code(s): 57181440


   Code(s): E87.1 - HYPO-OSMOLALITY AND HYPONATREMIA   Status: Acute   Current 

Visit: Yes   





(9) Hypophosphatemia


SNOMED Code(s): 6524868


   Code(s): E83.39 - OTHER DISORDERS OF PHOSPHORUS METABOLISM   Status: Acute   

Current Visit: Yes   





- Problem List Review


Problem List Initiated/Reviewed/Updated: Yes





- My Orders


Last 24 Hours: 


My Active Orders





20 14:00


Polymyxin B/Trimethoprim [PolyTrim Ophth Soln]   1 ml EYEBOTH Q4HWA 





20 15:00


Dextrose 5%-0.9% NaCl [Dextrose 5%-Normal Saline] 1,000 ml IV ASDIRECTED 





20 Breakfast


Low Fat Diet [DIET] 


Low Fiber Diet [DIET] 





20 05:11


AMYLASE [CHEM] AM 


CBC WITH AUTO DIFF [HEME] AM 


CMP [COMPREHENSIVE METABOLIC PN,CMP] [CHEM] AM 


LIPASE [CHEM] AM 


MAGNESIUM [CHEM] AM 


PHOSPHORUS [CHEM] AM 





20 05:11


AMYLASE [CHEM] AM 


CBC WITH AUTO DIFF [HEME] AM 


CMP [COMPREHENSIVE METABOLIC PN,CMP] [CHEM] AM 


LIPASE [CHEM] AM 


MAGNESIUM [CHEM] AM 


PHOSPHORUS [CHEM] AM 














- Plan


Plan:: 





DAY OF ADMISSION


- Ethanol level zero on admission + binge alcohol drinking pattern


   - Admitted with CIWA protocol


- No need for steroid use due to Maddrey score of 8


- Negative Tylenol and salicylate level


- Positive UDS for THC


- Normal coagulation studies, no need for vitamin K


- Normal kidney function


- Smoked 0.5ppd


- Depression


   - Worsening depression as per patient


   - Previously treated with paroxetine, he discontinued due to sexual 

dysfunction


   - Denies any suicidal or homicidal ideation


   - Denies any previous psychiatric admissions





DAY 1


- Evaluated by psychiatry 


   - Recommended initiating Seroquel, Topamax, Prozac and Ativan as needed


   - Outpatient rehabilitation


- Complete abdominal US with mas in pancreatic head recommending triple phase 

CT 


- Electrolytes continue to be abnormal, will replace


- Liver function tests still significantly elevate but trending down


- Patient remains NPO


- Afebrile


- CT imaging with pancreatic head mass possibly in ampulla of vater


Case discussed with [facility] in [place], [place]--> 


States that the findings on pancreatic head appear to be same density of the 

rest of the pancreas and does not appear to be a mas


There is some stranding around the pancreas which represents acute edema and 

stated patient likely is having acute pancreatitis without pain


Recommended outpatient endoscopic ultrasound 4 weeks after resolution of 

current clinical picture


DAY 2


- LFTs trending down


- Pancreatic enzymes trending down


- No pain


- Is hungry


- Afebrile


- CIWA negative


Day 3


* Increasing confusion and hallucination overnight


* Required significant Ativan and 5 mg of Haldol due to hallucination, confusion

, and concern for self-harm.


* Transfer to ICU for close monitoring and one-on-one care.


* Lipase decreasin down to 854


* Amylase stable at 119


* Bilirubin increased to 14, liver enzymes decreased from admission


* PT and INR slightly elevated at 13.1 and 1.22 respectively


* Patient n.p.o. but will advance diet as tolerated once his mental status 

improves


* Ammonia level is normal at 28.  This makes it less likely to be hepatic 

encephalopathy.


* Phosphorus 0.9


* Alcoholic encephalopathy


* Left eye matted and conjunctiva is erythematous


Day 4


* Significantly improved mentation today.  Did not receive any more Ativan 

yesterday afternoon or evening.


* Patient denies having any abdominal pain.  


* Elevated lipase likely secondary to either the pancreatic mass or possibly 

chronic pancreatitis.


* Start a low residual low-fat diet today.


* Total bilirubin 12.6, direct 10.6, GGT 1519, , , alkaline 

phosphatase 368,


* Phosphorus 2.1 and potassium 3.5





Acute alcoholic hepatitis


Alcohol abuse, binge pattern


Hepatomegaly


- NS 


- Repeat labs in AM


- Thiamine and folic acid supplementation


- [facility] protocol


-Viral hepatitis panel, negative


-Start low residual, low-fat diet.


-Stop daily lipase and amylase





Hypokalemia -resolved


Hypophosphatemia


Hyponatremia -improved


-If able to take orally will stop IV fluids


-Potassium phosphate pharmacy to dose





Current every day smoker


- Nicotine patch 14mg





Depression


- Thiamine, folic acid


-Stop Seroquel.  Patient had significant confusion last night and Seroquel 

could exacerbate that.


-Stop Topamax.  Also stopped Topamax secondary to possibility of worsening 

confusion.


- Start Prozac





Hypoalbuminemia


Weight loss


- Dietary consult





Hypomagnesemia, resolved





Conjunctivitis, left


-Warm compresses 3 times daily


-Polytrim eyedrops 1 drop every 4 hours while awake





PROPHYLAXIS


DVT- compression stockings


GI- pantoprazole





CODE STATUS: FULL CODE





DISPOSITION:


Patient will be admitted for supportive care and monitorization of liver 

function. 





SOCIAL:


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





Ultrasound abdomen: 


1.  Abnormal pancreas suggesting the possibility of chronic pancreatitis.  

Questionable mass within the mid pancreas measuring 2.2 cm.  Three-phase 

contrast-enhanced CT of the pancreas is recommended to further evaluate.


2.  Sludge within the gallbladder.


3.  Fatty infiltration within the liver with mild hepatomegaly.





CT of the abdomen and pelvis with and without contrast: 


1.  Findings suspicious for nonspecific colitis as described above.


2.  Focal area of poor enhancement within the head and uncinate process of the 

pancreas.  This could represent a focal area of pancreatitis as well as a small 

mass which occurs near the ampulla of Vater.  Masses felt more likely.  

Recommend referral to gastroenterologist for opinion whether biopsy or ERCP can 

be performed.  This finding would be difficult to biopsy by percutaneous 

technique.  Also, please correlate if patient has elevated amylase and lipase 

to indicate pancreatitis.


3.  Diffuse fatty infiltration within the liver with hepatomegaly.

## 2020-04-23 NOTE — PCM.PN
- General Info


Date of Service: 20


Admission Dx/Problem (Free Text): 





Alcoholic Pancreatitis


Functional Status: Reports: Pain Controlled, Tolerating Diet, Ambulating, 

Urinating.  Denies: New Symptoms





- Review of Systems


General: Reports: Weakness, Fatigue (has not been sleeping well ), Appetite.  

Denies: Fever, Malaise, Chills


HEENT: Reports: Eye Pain (improving ).  Denies: Headaches, Sore Throat


Pulmonary: Reports: No Symptoms.  Denies: Shortness of Breath, Pleuritic Chest 

Pain, Cough, Sputum, Wheezing


Cardiovascular: Reports: No Symptoms.  Denies: Chest Pain, Palpitations, 

Dyspnea on Exertion, Edema


Gastrointestinal: Reports: No Symptoms.  Denies: Abdominal Pain, Constipation, 

Diarrhea, Nausea, Vomiting


Genitourinary: Reports: No Symptoms.  Denies: Pain


Musculoskeletal: Reports: No Symptoms


Skin: Reports: Jaundice


Neurological: Reports: No Symptoms.  Denies: Confusion, Difficulty Walking, 

Weakness, Gait Disturbance


Psychiatric: Reports: No Symptoms





- Patient Data


Vitals - Most Recent: 


 Last Vital Signs











Temp  98.1 F   20 03:52


 


Pulse  113 H  20 03:52


 


Resp  16   20 03:52


 


BP  111/76   20 03:52


 


Pulse Ox  100   20 03:52











Weight - Most Recent: 156 lb 1.6 oz


I&O - Last 24 Hours: 


 Intake & Output











 20





 22:59 06:59 14:59


 


Intake Total 500 600 


 


Output Total 600 1450 


 


Balance -100 -850 











Lab Results Last 24 Hours: 


 Laboratory Results - last 24 hr











  20 Range/Units





  05:34 05:05 05:05 


 


WBC    7.00  (4.23-9.07)  K/mm3


 


RBC    2.66 L  (4.63-6.08)  M/mm3


 


Hgb    10.1 L  (13.7-17.5)  gm/dl


 


Hct    30.9 L  (40.1-51.0)  %


 


MCV    116.2 H  (79.0-92.2)  fl


 


MCH    38.0 H  (25.7-32.2)  pg


 


MCHC    32.7  (32.2-35.5)  g/dl


 


RDW Std Deviation    68.3 H  (35.1-43.9)  fL


 


Plt Count    120 L  (163-337)  K/mm3


 


MPV    11.6  (9.4-12.3)  fl


 


Neut % (Auto)    61.0  (34.0-67.9)  %


 


Lymph % (Auto)    20.7 L  (21.8-53.1)  %


 


Mono % (Auto)    16.0 H  (5.3-12.2)  %


 


Eos % (Auto)    1.0  (0.8-7.0)  


 


Baso % (Auto)    0.6  (0.1-1.2)  %


 


Neut # (Auto)    4.27  (1.78-5.38)  K/mm3


 


Lymph # (Auto)    1.45  (1.32-3.57)  K/mm3


 


Mono # (Auto)    1.12 H  (0.30-0.82)  K/mm3


 


Eos # (Auto)    0.07  (0.04-0.54)  K/mm3


 


Baso # (Auto)    0.04  (0.01-0.08)  K/mm3


 


Manual Slide Review    Abnormal smear  


 


Sodium   135 L   (136-145)  mEq/L


 


Potassium   3.7   (3.5-5.1)  mEq/L


 


Chloride   101   ()  mEq/L


 


Carbon Dioxide   23   (21-32)  mEq/L


 


Anion Gap   14.7   (5-15)  


 


BUN   2 L   (7-18)  mg/dL


 


Creatinine   0.5 L   (0.7-1.3)  mg/dL


 


Est Cr Clr Drug Dosing   200.62   mL/min


 


Estimated GFR (MDRD)   > 60   (>60)  mL/min


 


BUN/Creatinine Ratio   4.0 L   (14-18)  


 


Glucose   87   ()  mg/dL


 


Calcium   8.6   (8.5-10.1)  mg/dL


 


Phosphorus   2.2 L   (2.6-4.7)  mg/dL


 


Magnesium   1.9   (1.8-2.4)  mg/dl


 


Total Bilirubin   13.0 H   (0.2-1.0)  mg/dL


 


Direct Bilirubin   11.10 H   (0.0-0.2)  mg/dl


 


GGT   1560 H   (15-85)  U/L


 


AST   304 H   (15-37)  U/L


 


ALT   146 H   (16-63)  U/L


 


Alkaline Phosphatase   414 H   ()  U/L


 


Total Protein   5.9 L   (6.4-8.2)  g/dl


 


Albumin   2.4 L   (3.4-5.0)  g/dl


 


Globulin   3.5   gm/dL


 


Albumin/Globulin Ratio   0.7 L   (1-2)  


 


CA 19-9 Antigen  521 H    (0-35)  U/mL











Med Orders - Current: 


 Current Medications





Fluoxetine HCl (Prozac)  20 mg PO DAILY [facility]


   Last Admin: 20 08:58 Dose:  20 mg


Folic Acid (Folic Acid)  1 mg PO DAILY [facility]


   Last Admin: 20 08:58 Dose:  1 mg


Potassium Phosphate 30 mmole/ (Sodium Chloride)  510 mls @ 102 mls/hr IV 

ONETIME ONE


   Stop: 20 13:59


   Last Admin: 20 09:00 Dose:  102 mls/hr


Lorazepam (Ativan)  1 - 3 mg IV ASDIRECTED [facility]; Protocol


   Last Admin: 20 03:15 Dose:  2 mg


Lorazepam (Ativan)  1 - 3 mg PO ASDIRECTED [facility]; Protocol


   Last Admin: 20 01:07 Dose:  2 mg


Nicotine (Habitrol)  14 mg TRDERM DAILY [facility]


   Last Admin: 20 08:58 Dose:  14 mg


Ondansetron HCl (Zofran Odt)  4 mg PO Q6H PRN


   PRN Reason: nausea, able to take PO


Ondansetron HCl (Zofran)  4 mg IV Q6H PRN


   PRN Reason: Nausea/Vomiting


Polymyxin/Trimethoprim Sulfate (Polytrim Ophth Soln)  1 ml EYEBOTH Q4HWA [facility]


   Last Admin: 20 07:01 Dose:  Not Given


Sodium Phosphate (Neutra-Phos)  250 mg PO QID [facility]


Thiamine HCl (Vitamin B-1)  100 mg PO DAILY [facility]


   Last Admin: 20 08:58 Dose:  100 mg





Discontinued Medications





Acetaminophen (Tylenol)  650 mg PO NOW ONE


   Stop: 20 16:52


   Last Admin: 20 17:54 Dose:  Not Given


Cyanocobalamin (Vitamin B12)  1,000 mcg IM ONETIME ONE


   Stop: 20 15:26


   Last Admin: 20 15:52 Dose:  1,000 mcg


Folic Acid (Folic Acid)  1 mg IV ONETIME ONE


   Stop: 20 15:26


Folic Acid (Folic Acid)  1 mg IV DAILY [facility]


   Last Admin: 20 09:44 Dose:  1 mg


Haloperidol Lactate (Haldol)  5 mg IM ONETIME ONE


   Stop: 20 02:16


   Last Admin: 20 02:23 Dose:  5 mg


Haloperidol Lactate (Haldol) Confirm Administered Dose 5 mg .ROUTE .STK-MED ONE


   Stop: 20 02:19


   Last Admin: 20 02:24 Dose:  Not Given


Lactated Ringer's (Ringers, Lactated)  1,000 mls @ 999 mls/hr IV .BOLUS ONE


   Stop: 20 16:25


   Last Admin: 20 15:42 Dose:  999 mls/hr


Lactated Ringer's (Ringers, Lactated)  1,000 mls @ 999 mls/hr IV .BOLUS ONE


   Stop: 20 16:27


   Last Admin: 20 16:44 Dose:  999 mls/hr


Potassium Chloride/Sodium Chloride (Normal Saline With 20 Meq Kcl)  1,000 mls @ 

150 mls/hr IV ASDIRECTED [facility]


   Last Admin: 20 17:34 Dose:  150 mls/hr


Sodium Chloride (Normal Saline)  1,000 mls @ 150 mls/hr IV ASDIRECTED [facility]


   Last Admin: 20 15:01 Dose:  150 mls/hr


Lactated Ringer's (Ringers, Lactated)  1,000 mls @ 250 mls/hr IV ASDIRECTED [facility]


Magnesium Sulfate 4 gm/ Premix  50 mls @ 12.5 mls/hr IV ONETIME ONE


   Stop: 20 22:52


   Last Admin: 20 20:32 Dose:  12.5 mls/hr


Potassium Chloride 10 meq/ (Premix)  100 mls @ 100 mls/hr IV Q1H [facility]


   Stop: 20 20:59


   Last Admin: 20 22:04 Dose:  100 mls/hr


Sodium Chloride (Normal Saline)  100 mls @ 60 mls/hr IV ASDIRECTED [facility]


   Stop: 20 16:00


   Last Admin: 20 11:49 Dose:  60 mls/hr


Potassium Chloride 10 meq/ (Premix)  100 mls @ 100 mls/hr IV Q1H [facility]


   Stop: 20 22:44


   Last Admin: 20 23:39 Dose:  100 mls/hr


Potassium Chloride/Sodium Chloride (Normal Saline With 40 Meq Kcl)  1,000 mls @ 

150 mls/hr IV ASDIRECTED [facility]


   Last Admin: 20 01:08 Dose:  150 mls/hr


Sodium Phosphate 60 mmole/ (Sodium Chloride)  270 mls @ 67 mls/hr IV ONETIME ONE


   Stop: 20 23:16


   Last Admin: 20 20:09 Dose:  67 mls/hr


Magnesium Sulfate 2 gm/ Premix  50 mls @ 25 mls/hr IV ONETIME ONE


   Stop: 20 10:29


   Last Admin: 20 09:01 Dose:  25 mls/hr


Sodium Chloride (Normal Saline)  1,000 mls @ 150 mls/hr IV ASDIRECTED [facility]


   Stop: 20 15:00


   Last Admin: 20 08:30 Dose:  150 mls/hr


Dextrose/Sodium Chloride (Dextrose 5%-Normal Saline)  1,000 mls @ 125 mls/hr IV 

ASDIRECTED [facility]


   Last Admin: 20 04:52 Dose:  125 mls/hr


Sodium Phosphate 30 mmole/ (Sodium Chloride)  260 mls @ 130 mls/hr IV Q2H [facility]


   Stop: 20 14:59


   Last Admin: 20 13:04 Dose:  130 mls/hr


Magnesium Sulfate 2 gm/ Premix  50 mls @ 25 mls/hr IV Q1H [facility]


   Stop: 20 22:29


   Last Admin: 20 22:03 Dose:  25 mls/hr


Potassium Phosphate 15 mmole/ (Sodium Chloride)  255 mls @ 85 mls/hr IV 

ASDIRECTED [facility]


   Stop: 20 23:29


Sodium Phosphate 30 mmole/ (Sodium Chloride)  260 mls @ 86.667 mls/hr IV 

ONETIME ONE


   Stop: 20 00:29


   Last Admin: 20 21:35 Dose:  86.667 mls/hr


Potassium Phosphate 30 mmole/ (Sodium Chloride)  510 mls @ 102 mls/hr IV 

ONETIME ONE


   Stop: 20 16:29


   Last Admin: 20 11:06 Dose:  102 mls/hr


Iopamidol (Isovue-370 (76%))  100 ml IVPUSH ONETIME ONE


   Stop: 20 11:45


   Last Admin: 20 11:49 Dose:  100 ml


Lorazepam (Ativan)  0 mg PO ASDIRECTED [facility]; Protocol


Lorazepam (Ativan)  0 mg IV ASDIRECTED [facility]; Protocol


Lorazepam (Ativan)  1 mg PO ONETIME ONE


   Stop: 20 14:30


   Last Admin: 20 15:00 Dose:  1 mg


Ondansetron HCl (Zofran)  4 mg IVPUSH ONETIME ONE


   Stop: 20 15:26


   Last Admin: 20 15:42 Dose:  4 mg


Potassium Chloride (Klor-Con M20)  20 meq PO ONETIME ONE


   Stop: 20 21:08


   Last Admin: 20 21:34 Dose:  20 meq


Quetiapine Fumarate (Seroquel)  25 mg PO BEDTIME [facility]


   Last Admin: 20 21:29 Dose:  25 mg


Sodium Chloride (Saline Flush)  10 ml FLUSH ONETIME ONE


   Stop: 20 11:45


   Last Admin: 20 11:49 Dose:  10 ml


Topiramate (Topamax)  25 mg PO BID [facility]


   Last Admin: 20 21:29 Dose:  25 mg











- Exam


Quality Assessment: DVT Prophylaxis


General: Alert, Oriented, Cooperative, No Acute Distress, Other


HEENT: Pupils Equal, Pupils Reactive, Mucous Membr. Moist/Pink, Scleral Icterus

, Other (Injected conjunctiva )


Neck: Supple, Trachea Midline


Lungs: Clear to Auscultation, Normal Respiratory Effort


Cardiovascular: Regular Rate, Regular Rhythm


GI/Abdominal Exam: Normal Bowel Sounds, Soft, Non-Tender, No Distention, No 

Abnormal Bruit, Hepatomegaly


 (Male) Exam: Deferred


Back Exam: Normal Inspection, Full Range of Motion


Extremities: Normal Inspection, Normal Range of Motion, Non-Tender, No Pedal 

Edema, Normal Capillary Refill


Skin: Warm, Dry, Intact, Other (Jaundiced )


Neurological: No New Focal Deficit


Psy/Mental Status: Alert





Sepsis Event Note





- Evaluation


Sepsis Screening Result: No Definite Risk





- Focused Exam


Vital Signs: 


 Vital Signs











  Temp Pulse Resp BP Pulse Ox


 


 20 03:52  98.1 F  113 H  16  111/76  100











Date Exam was Performed: 20


Time Exam was Performed: 13:08





- Problem List & Annotations


(1) Hyperbilirubinemia


SNOMED Code(s): 11971459


   Code(s): E80.6 - OTHER DISORDERS OF BILIRUBIN METABOLISM   Status: Acute   

Priority: High   Current Visit: Yes   





(2) Alcohol abuse


SNOMED Code(s): 99599529


   Code(s): F10.10 - ALCOHOL ABUSE, UNCOMPLICATED   Status: Chronic   Priority: 

High   Current Visit: Yes   





(3) Alcoholic encephalopathy


SNOMED Code(s): 853904711


   Code(s): G31.2 - DEGENERATION OF NERVOUS SYSTEM DUE TO ALCOHOL; F10.20 - 

ALCOHOL DEPENDENCE, UNCOMPLICATED   Status: Acute   Priority: High   Current 

Visit: Yes   





(4) Alcoholic hepatitis


SNOMED Code(s): 644395945


   Code(s): K70.10 - ALCOHOLIC HEPATITIS WITHOUT ASCITES   Status: Acute   

Priority: High   Current Visit: Yes   


Qualifiers: 


   Ascites presence: unspecified   Qualified Code(s): K70.10 - Alcoholic 

hepatitis without ascites   





(5) Current every day smoker


SNOMED Code(s): 803897572, 626610225


   Code(s): F17.200 - NICOTINE DEPENDENCE, UNSPECIFIED, UNCOMPLICATED   Status: 

Chronic   Priority: Medium   Current Visit: Yes   





(6) Hepatomegaly


SNOMED Code(s): 05823935


   Code(s): R16.0 - HEPATOMEGALY, NOT ELSEWHERE CLASSIFIED   Status: Chronic   

Priority: High   Current Visit: Yes   





(7) Hypoalbuminemia


SNOMED Code(s): 739982157


   Code(s): E88.09 - OTH DISORDERS OF PLASMA-PROTEIN METABOLISM, NEC   Status: 

Acute   Priority: High   Current Visit: Yes   





(8) Hypokalemia


SNOMED Code(s): 46660219


   Code(s): E87.6 - HYPOKALEMIA   Status: Resolved   Priority: High   Current 

Visit: Yes   





(9) Hypomagnesemia


SNOMED Code(s): 871626930


   Code(s): E83.42 - HYPOMAGNESEMIA   Status: Resolved   Priority: High   

Current Visit: Yes   





(10) Hyponatremia


SNOMED Code(s): 31558809


   Code(s): E87.1 - HYPO-OSMOLALITY AND HYPONATREMIA   Status: Acute   Priority

: High   Current Visit: Yes   





(11) Hypophosphatemia


SNOMED Code(s): 0445654


   Code(s): E83.39 - OTHER DISORDERS OF PHOSPHORUS METABOLISM   Status: Acute   

Priority: High   Current Visit: Yes   





(12) Macrocytic anemia


SNOMED Code(s): 20263437


   Code(s): D53.9 - NUTRITIONAL ANEMIA, UNSPECIFIED   Status: Acute   Priority: 

High   Current Visit: Yes   





(13) Marijuana smoker


SNOMED Code(s): 654371305


   Code(s): F12.90 - CANNABIS USE, UNSPECIFIED, UNCOMPLICATED   Status: Chronic

   Priority: Medium   Current Visit: Yes   





(14) Pancreatic mass


SNOMED Code(s): 062143460


   Code(s): K86.89 - OTHER SPECIFIED DISEASES OF PANCREAS   Status: Acute   

Priority: High   Current Visit: Yes   





(15) Thrombocytopenia


SNOMED Code(s): 410625834


   Code(s): D69.6 - THROMBOCYTOPENIA, UNSPECIFIED   Status: Acute   Priority: 

High   Current Visit: Yes   





(16) Anxiety


SNOMED Code(s): 45781579


   Code(s): F41.9 - ANXIETY DISORDER, UNSPECIFIED   Status: Chronic   Current 

Visit: No   





(17) Degenerative disc disease at L5-S1 level


SNOMED Code(s): 91232625


   Code(s): M51.36 - OTHER INTERVERTEBRAL DISC DEGENERATION, LUMBAR REGION   

Status: Chronic   Priority: Medium   Current Visit: No   





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


Status: Chronic   Current Visit: No   


Qualifiers: 


   Spinal osteoarthritis complication: with radiculopathy   Qualified Code(s): 

M47.26 - Other spondylosis with radiculopathy, lumbar region   





(19) Depression


SNOMED Code(s): 17168038


   Code(s): F32.9 - MAJOR DEPRESSIVE DISORDER, SINGLE EPISODE, UNSPECIFIED   

Status: Chronic   Priority: Medium   Current Visit: No   


Qualifiers: 


   Depression Type: other depression   Qualified Code(s): F32.89 - Other 

specified depressive episodes   





(20) Low back pain


SNOMED Code(s): 400949825


   Code(s): M54.5 - LOW BACK PAIN   Status: Chronic   Priority: Medium   

Current Visit: No   


Qualifiers: 


   Chronicity: unspecified   Back pain laterality: unspecified 





(21)  above reference range


SNOMED Code(s): 79181927683300513


   Code(s): PMX4500 -    Status: Acute   Priority: High   Current Visit: Yes   





- Problem List Review


Problem List Initiated/Reviewed/Updated: Yes





- My Orders


Last 24 Hours: 


My Active Orders





20 09:00


Potassium Phosphates 30 mmole   Sodium Chloride 0.9% [Normal Saline] 500 ml IV 

ONETIME 





20 09:36


Abdomen wo Cont [MR] Routine 





20 13:00


Phosphorus #1 [Neutra-Phos]   250 mg PO QID 





20 Lunch


NPO Now [Nothing per Oral Now Diet] [DIET] 














- Plan


Plan:: 





DAY OF ADMISSION


- Ethanol level zero on admission + binge alcohol drinking pattern


   - Admitted with CIWA protocol


- No need for steroid use due to Maddrey score of 8


- Negative Tylenol and salicylate level


- Positive UDS for THC


- Normal coagulation studies, no need for vitamin K


- Normal kidney function


- Smoked 0.5ppd


- Depression


   - Worsening depression as per patient


   - Previously treated with paroxetine, he discontinued due to sexual 

dysfunction


   - Denies any suicidal or homicidal ideation


   - Denies any previous psychiatric admissions





DAY 1


- Evaluated by psychiatry 


   - Recommended initiating Seroquel, Topamax, Prozac and Ativan as needed


   - Outpatient rehabilitation


- Complete abdominal US with mas in pancreatic head recommending triple phase 

CT 


- Electrolytes continue to be abnormal, will replace


- Liver function tests still significantly elevate but trending down


- Patient remains NPO


- Afebrile


- CT imaging with pancreatic head mass possibly in ampulla of vater


Case discussed with [facility] in [place], [place]--> 


States that the findings on pancreatic head appear to be same density of the 

rest of the pancreas and does not appear to be a mas


There is some stranding around the pancreas which represents acute edema and 

stated patient likely is having acute pancreatitis without pain


Recommended outpatient endoscopic ultrasound 4 weeks after resolution of 

current clinical picture


DAY 2


- LFTs trending down


- Pancreatic enzymes trending down


- No pain


- Is hungry


- Afebrile


- CIWA negative


Day 3


* Increasing confusion and hallucination overnight


* Required significant Ativan and 5 mg of Haldol due to hallucination, confusion

, and concern for self-harm.


* Transfer to ICU for close monitoring and one-on-one care.


* Lipase decreasin down to 854


* Amylase stable at 119


* Bilirubin increased to 14, liver enzymes decreased from admission


* PT and INR slightly elevated at 13.1 and 1.22 respectively


* Patient n.p.o. but will advance diet as tolerated once his mental status 

improves


* Ammonia level is normal at 28.  This makes it less likely to be hepatic 

encephalopathy.


* Phosphorus 0.9


* Alcoholic encephalopathy


* Left eye matted and conjunctiva is erythematous


Day 4


* Significantly improved mentation today.  Did not receive any more Ativan 

yesterday afternoon or evening.


* Patient denies having any abdominal pain.  


* Elevated lipase likely secondary to either the pancreatic mass or possibly 

chronic pancreatitis.


* Start a low residual low-fat diet today.


* Total bilirubin 12.6, direct 10.6, GGT 1519, , , alkaline 

phosphatase 368,


* Phosphorus 2.1 and potassium 3.5


Day 5


* Total bilirubin 13.0, direct 11.1, GGT 1560, , , alkaline 

phosphatase 414


* NPO now until after MRCP


* Contacted [facility] one-call - discussed patient with GI. Will order MRCP 

at their recommendation. 


* Repeat labs today


* Start daily PO phosphorous


* Phosphorous 2.2





Acute alcoholic hepatitis


Alcohol abuse, binge pattern


Hepatomegaly


- Repeat labs in AM


- Thiamine and folic acid supplementation


- [facility] protocol


- Viral hepatitis panel, negative


- NPO until after scan


- Awaiting results of MRCP





Hypokalemia -resolved


Hypophosphatemia


Hyponatremia -improved


-K Phos IV now


-Start daily phosphorous supplementation





Current every day smoker


- Nicotine patch 14mg





Depression


- Thiamine, folic acid


- Stop Seroquel.  Patient had significant confusion last night and Seroquel 

could exacerbate that.


- Stop Topamax.  Also stopped Topamax secondary to possibility of worsening 

confusion.


- Start Prozac


- Will need psychiatry follow-up





Hypoalbuminemia


Weight loss


- Dietary consult





Hypomagnesemia, resolved





Conjunctivitis, left


-Warm compresses 3 times daily


-Polytrim eyedrops 1 drop every 4 hours while awake





PROPHYLAXIS


DVT- compression stockings


GI- pantoprazole





CODE STATUS: FULL CODE





DISPOSITION:


Patient will be admitted for supportive care and monitorization of liver 

function. 





SOCIAL:


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





Ultrasound abdomen: 


1.  Abnormal pancreas suggesting the possibility of chronic pancreatitis.  

Questionable mass within the mid pancreas measuring 2.2 cm.  Three-phase 

contrast-enhanced CT of the pancreas is recommended to further evaluate.


2.  Sludge within the gallbladder.


3.  Fatty infiltration within the liver with mild hepatomegaly.





CT of the abdomen and pelvis with and without contrast: 


1.  Findings suspicious for nonspecific colitis as described above.


2.  Focal area of poor enhancement within the head and uncinate process of the 

pancreas.  This could represent a focal area of pancreatitis as well as a small 

mass which occurs near the ampulla of Vater.  Masses felt more likely.  

Recommend referral to gastroenterologist for opinion whether biopsy or ERCP can 

be performed.  This finding would be difficult to biopsy by percutaneous 

technique.  Also, please correlate if patient has elevated amylase and lipase 

to indicate pancreatitis.


3.  Diffuse fatty infiltration within the liver with hepatomegaly.

## 2020-04-23 NOTE — MR
MRI abdomen (without contrast) and MRCP.

 

Technique: Various noncontrast sequences were obtained in axial and 

coronal planes as well as MRCP study.

 

Comparison: Previous CT abdomen and pelvis exam of 04/19/20.

 

Findings: Fatty infiltration is noted within the liver.  Liver is also

 generous in size.  

 

Previously questioned pancreatic abnormality within the uncinate and 

head of the pancreas is not seen on this exam although contrast study 

was not performed.  

 

There is mild amount of ascites being seen around the liver and spleen

 and around the pancreas which raises the question of pancreatitis.  

Previous mass may represent change from pancreatitis rather than mass.

 

No filling defects are seen within the CHD and CBD on the MRCP study. 

 CHD and CBD size appears normal.

 

Impression:

1.  Small amount of ascites around the liver and spleen and pancreas 

are seen.  These findings raise the question of pancreatitis.  

Previous mass within the pancreas not seen on current exam although no

 contrast was utilized to confirm.  Previous mass may represent change

 from pancreatitis rather than actual pancreatic mass.  Follow-up 

contrast study by CT recommended in 6 months to confirm disappearance.

2.  Fatty infiltration within the liver.

 

Diagnostic code #3

 

This report was dictated in MDT

## 2020-04-24 NOTE — PN
DATE OF SERVICE:  04/23/2020

 

ADDENDUM:

The patient was seen, examined, and discussed by me with Saji Smith PA-C.

Mr. Flores is a 39-year-old white male with past medical history significant for

chronic alcoholism and history of prior pancreatitis, who was admitted to the

hospital on 04/18/2020, from emergency room where the patient presented with

nausea, vomiting, and weakness for approximately 2 weeks.  Further account that

was done in the emergency room revealed that the patient has acute on top of

chronic pancreatitis and alcoholic hepatitis with significant elevation of LFTs.

On top of this, the patient was found with thrombocytopenia that is believed to

be part of the patient's acute on chronic alcoholic hepatitis.  The patient was

admitted to Med-Surg floor telemetry.  He received appropriate medications for

potential alcohol withdrawal and vitamins for patient's alcoholism as we kept

watching the patient's LFTs and signs of pancreatitis.  Clinically, the patient

has  transfer improvement, but his blood test results show  worsening of LFTs,

specifically bilirubin went up as high as 15.0 today.  The patient still has

very high LFTs and lipase is also high.  A CT scan that was done showed that the

patient has possible pancreatic mass versus acute focus of pancreatitis, that

all makes the patient's condition and prognosis very severe.  ________ after

talking to tertiary facility consultants, decision is to go ahead with MRCP,

trying to address the patient's severe cholestatic alcoholic hepatitis.  We will

start the patient on ursodiol 250 mg p.o. three times a day with meals.  We will

keep watching the patient clinically and we will keep watching the patient's

blood test results.  The patient will stay in-house.  Condition is very serious

and if we do not see any obvious improvement in the patient's lab test results

and general condition within the next 1 or 2 days, we will consider transfer to

tertiary facility for ERCP and management by multispecialty group.

 

Full details of the patient's review of systems, interval test results, physical

exam, medications, and further plan of management, please see note prepared by

Saji Smith PA-C.

 

DD:  04/23/2020 13:51:10

DT:  04/23/2020 18:01:37  [facility]

Job #:  [id number]

## 2020-04-24 NOTE — PN
DATE OF SERVICE:  04/24/2020

 

ADDENDUM:

The patient was seen, examined, and discussed by me with Saji Smith PA-C.

 

Mr. Flores overall reported today that he feels well.  He does not have any

acute complaints.  The patient does participate in physical therapy.  No nausea,

no vomiting.  The patient is still very weak but weakness also improving.  In

blood test results today, we do see modest but improvement of LFTs, lipase, and

bilirubin that is still very high at 11.2.  MRCP was done, did not reveal any

suspicious mass or any obstruction, so diagnosis remains the same.  Acute on

chronic alcoholic pancreatitis, alcohol-induced cholestatic hepatitis, and

gallbladder disease that still requires treatment, but treatment can be done

only after the patient's condition hopefully stabilizes.  We will continue

physical therapy.  We will continue diet.  We will continue ursodiol 250 mg p.o.

3 times a day with meals.  We will keep watch on labs and physical condition.  I

hope in 2 to 3 days, the patient's condition will be good enough for discharge,

but discharge suggested to rehab facility for alcohol abuse, that hopefully will

be arranged.

 

For details of the patient's review of systems, interval test results, physical

exam, medications, and further plan of management, please see note prepared by

Saji Smith PA-C.

 

DD:  04/24/2020 10:23:32

DT:  04/24/2020 11:13:13  [facility]AN

Job #:  [id number]
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
## 2020-04-25 NOTE — PCM.PN
- General Info


Date of Service: 20





- Review of Systems


General: Reports: Fever


Systems Review Comment:: 





The patient was seen and examined by me, and discussed with Dr. Denney.  At 

time of my exam, patient resting in bed.  Does not appear to be in acute 

distress.  Patient still looks jaundiced, but not as compared to yesterday.  

The patient has had 2 bowel movements today.  Denies any abdominal pain, denies 

any nausea vomiting no reports of chest pain or palpitations.  No fevers 

overnight patient's vital signs relatively stable nursing staff did not report 

any acute complaints with the patient overnight.





- Patient Data


Vitals - Most Recent: 


 Last Vital Signs











Temp  98.0 F   20 09:26


 


Pulse  100   20 09:26


 


Resp  18   20 09:26


 


BP  92/71   20 09:26


 


Pulse Ox  100   20 09:26











Weight - Most Recent: 160 lb


I&O - Last 24 Hours: 


 Intake & Output











 20





 22:59 06:59 14:59


 


Intake Total 2404 2750 


 


Output Total  2950 


 


Balance 404 -200 











Lab Results Last 24 Hours: 


 Laboratory Results - last 24 hr











  20 Range/Units





  05:26 05:26 


 


WBC  6.47   (4.23-9.07)  K/mm3


 


RBC  2.62 L   (4.63-6.08)  M/mm3


 


Hgb  9.8 L   (13.7-17.5)  gm/dl


 


Hct  30.6 L   (40.1-51.0)  %


 


MCV  116.8 H   (79.0-92.2)  fl


 


MCH  37.4 H   (25.7-32.2)  pg


 


MCHC  32.0 L   (32.2-35.5)  g/dl


 


RDW Std Deviation  69.3 H   (35.1-43.9)  fL


 


Plt Count  135 L   (163-337)  K/mm3


 


MPV  11.5   (9.4-12.3)  fl


 


Neut % (Auto)  68.0 H   (34.0-67.9)  %


 


Lymph % (Auto)  14.7 L   (21.8-53.1)  %


 


Mono % (Auto)  14.7 H   (5.3-12.2)  %


 


Eos % (Auto)  0.9   (0.8-7.0)  


 


Baso % (Auto)  0.6   (0.1-1.2)  %


 


Neut # (Auto)  4.40   (1.78-5.38)  K/mm3


 


Lymph # (Auto)  0.95 L   (1.32-3.57)  K/mm3


 


Mono # (Auto)  0.95 H   (0.30-0.82)  K/mm3


 


Eos # (Auto)  0.06   (0.04-0.54)  K/mm3


 


Baso # (Auto)  0.04   (0.01-0.08)  K/mm3


 


Manual Slide Review  Abnormal smear   


 


Sodium   134 L  (136-145)  mEq/L


 


Potassium   3.6  (3.5-5.1)  mEq/L


 


Chloride   99  ()  mEq/L


 


Carbon Dioxide   23  (21-32)  mEq/L


 


Anion Gap   15.6 H  (5-15)  


 


BUN   4 L  (7-18)  mg/dL


 


Creatinine   0.6 L  (0.7-1.3)  mg/dL


 


Est Cr Clr Drug Dosing   171.36  mL/min


 


Estimated GFR (MDRD)   > 60  (>60)  mL/min


 


BUN/Creatinine Ratio   6.7 L  (14-18)  


 


Glucose   85  ()  mg/dL


 


Calcium   8.2 L  (8.5-10.1)  mg/dL


 


Phosphorus   2.6  (2.6-4.7)  mg/dL


 


Magnesium   2.1  (1.8-2.4)  mg/dl


 


Total Bilirubin   11.6 H  (0.2-1.0)  mg/dL


 


AST   225 H  (15-37)  U/L


 


ALT   120 H  (16-63)  U/L


 


Alkaline Phosphatase   368 H  ()  U/L


 


Total Protein   5.7 L  (6.4-8.2)  g/dl


 


Albumin   2.2 L  (3.4-5.0)  g/dl


 


Globulin   3.5  gm/dL


 


Albumin/Globulin Ratio   0.6 L  (1-2)  











Med Orders - Current: 


 Current Medications





Fluoxetine HCl (Prozac)  20 mg PO DAILY [facility]


   Last Admin: 20 09:43 Dose:  20 mg


Folic Acid (Folic Acid)  1 mg PO DAILY [facility]


   Last Admin: 20 09:43 Dose:  1 mg


Potassium Chloride/Dextrose/Sod Cl (D5 1/2 Ns W/ 20 Meq/L Kcl)  1,000 mls @ 75 

mls/hr IV ASDIRECTED [facility]


   Last Admin: 20 20:43 Dose:  75 mls/hr


Albumin Human (Flexbumin 25%)  12.5 gm in 50 mls @ 100 mls/hr IV ONETIME ONE


   Stop: 20 11:00


Albumin Human (Flexbumin 25%)  12.5 gm in 50 mls @ 100 mls/hr IV ONETIME ONE


   Stop: 20 11:29


Lorazepam (Ativan)  1 - 3 mg IV ASDIRECTED [facility]; Protocol


   Last Admin: 20 03:15 Dose:  2 mg


Lorazepam (Ativan)  1 - 3 mg PO ASDIRECTED [facility]; Protocol


   Last Admin: 20 01:07 Dose:  2 mg


Nicotine (Habitrol)  14 mg TRDERM DAILY [facility]


   Last Admin: 20 09:44 Dose:  14 mg


Ondansetron HCl (Zofran Odt)  4 mg PO Q6H PRN


   PRN Reason: nausea, able to take PO


Ondansetron HCl (Zofran)  4 mg IV Q6H PRN


   PRN Reason: Nausea/Vomiting


Polymyxin/Trimethoprim Sulfate (Polytrim Ophth Soln)  1 ml EYEBOTH Q4HWA [facility]


   Last Admin: 20 09:48 Dose:  1 drop


Sodium Phosphate (Neutra-Phos)  250 mg PO QID [facility]


   Last Admin: 20 09:44 Dose:  250 mg


Thiamine HCl (Vitamin B-1)  100 mg PO DAILY [facility]


   Last Admin: 20 09:44 Dose:  100 mg


Ursodiol (Mat 250)  250 mg PO TIDMEALS [facility]


   Last Admin: 20 06:50 Dose:  250 mg





Discontinued Medications





Acetaminophen (Tylenol)  650 mg PO NOW ONE


   Stop: 20 16:52


   Last Admin: 20 17:54 Dose:  Not Given


Cyanocobalamin (Vitamin B12)  1,000 mcg IM ONETIME ONE


   Stop: 20 15:26


   Last Admin: 20 15:52 Dose:  1,000 mcg


Folic Acid (Folic Acid)  1 mg IV ONETIME ONE


   Stop: 20 15:26


Folic Acid (Folic Acid)  1 mg IV DAILY [facility]


   Last Admin: 20 09:44 Dose:  1 mg


Haloperidol Lactate (Haldol)  5 mg IM ONETIME ONE


   Stop: 20 02:16


   Last Admin: 20 02:23 Dose:  5 mg


Haloperidol Lactate (Haldol) Confirm Administered Dose 5 mg .ROUTE .STK-MED ONE


   Stop: 20 02:19


   Last Admin: 20 02:24 Dose:  Not Given


Lactated Ringer's (Ringers, Lactated)  1,000 mls @ 999 mls/hr IV .BOLUS ONE


   Stop: 20 16:25


   Last Admin: 20 15:42 Dose:  999 mls/hr


Lactated Ringer's (Ringers, Lactated)  1,000 mls @ 999 mls/hr IV .BOLUS ONE


   Stop: 20 16:27


   Last Admin: 20 16:44 Dose:  999 mls/hr


Potassium Chloride/Sodium Chloride (Normal Saline With 20 Meq Kcl)  1,000 mls @ 

150 mls/hr IV ASDIRECTED [facility]


   Last Admin: 20 17:34 Dose:  150 mls/hr


Sodium Chloride (Normal Saline)  1,000 mls @ 150 mls/hr IV ASDIRECTED [facility]


   Last Admin: 20 15:01 Dose:  150 mls/hr


Lactated Ringer's (Ringers, Lactated)  1,000 mls @ 250 mls/hr IV ASDIRECTED [facility]


Magnesium Sulfate 4 gm/ Premix  50 mls @ 12.5 mls/hr IV ONETIME ONE


   Stop: 20 22:52


   Last Admin: 20 20:32 Dose:  12.5 mls/hr


Potassium Chloride 10 meq/ (Premix)  100 mls @ 100 mls/hr IV Q1H [facility]


   Stop: 20 20:59


   Last Admin: 20 22:04 Dose:  100 mls/hr


Sodium Chloride (Normal Saline)  100 mls @ 60 mls/hr IV ASDIRECTED [facility]


   Stop: 20 16:00


   Last Admin: 20 11:49 Dose:  60 mls/hr


Potassium Chloride 10 meq/ (Premix)  100 mls @ 100 mls/hr IV Q1H [facility]


   Stop: 20 22:44


   Last Admin: 20 23:39 Dose:  100 mls/hr


Potassium Chloride/Sodium Chloride (Normal Saline With 40 Meq Kcl)  1,000 mls @ 

150 mls/hr IV ASDIRECTED [facility]


   Last Admin: 20 01:08 Dose:  150 mls/hr


Sodium Phosphate 60 mmole/ (Sodium Chloride)  270 mls @ 67 mls/hr IV ONETIME ONE


   Stop: 20 23:16


   Last Admin: 20 20:09 Dose:  67 mls/hr


Magnesium Sulfate 2 gm/ Premix  50 mls @ 25 mls/hr IV ONETIME ONE


   Stop: 20 10:29


   Last Admin: 20 09:01 Dose:  25 mls/hr


Sodium Chloride (Normal Saline)  1,000 mls @ 150 mls/hr IV ASDIRECTED [facility]


   Stop: 20 15:00


   Last Admin: 20 08:30 Dose:  150 mls/hr


Dextrose/Sodium Chloride (Dextrose 5%-Normal Saline)  1,000 mls @ 125 mls/hr IV 

ASDIRECTED [facility]


   Last Admin: 20 04:52 Dose:  125 mls/hr


Sodium Phosphate 30 mmole/ (Sodium Chloride)  260 mls @ 130 mls/hr IV Q2H [facility]


   Stop: 20 14:59


   Last Admin: 20 13:04 Dose:  130 mls/hr


Magnesium Sulfate 2 gm/ Premix  50 mls @ 25 mls/hr IV Q1H [facility]


   Stop: 20 22:29


   Last Admin: 20 22:03 Dose:  25 mls/hr


Potassium Phosphate 15 mmole/ (Sodium Chloride)  255 mls @ 85 mls/hr IV 

ASDIRECTED [facility]


   Stop: 20 23:29


Sodium Phosphate 30 mmole/ (Sodium Chloride)  260 mls @ 86.667 mls/hr IV 

ONETIME ONE


   Stop: 20 00:29


   Last Admin: 20 21:35 Dose:  86.667 mls/hr


Potassium Phosphate 30 mmole/ (Sodium Chloride)  510 mls @ 102 mls/hr IV 

ONETIME ONE


   Stop: 20 16:29


   Last Admin: 20 11:06 Dose:  102 mls/hr


Potassium Phosphate 30 mmole/ (Sodium Chloride)  510 mls @ 102 mls/hr IV 

ONETIME ONE


   Stop: 20 13:59


   Last Admin: 20 09:00 Dose:  102 mls/hr


Magnesium Sulfate 2 gm/ Premix  50 mls @ 25 mls/hr IV ONETIME ONE


   Stop: 20 19:50


   Last Admin: 20 18:14 Dose:  25 mls/hr


Iopamidol (Isovue-370 (76%))  100 ml IVPUSH ONETIME ONE


   Stop: 20 11:45


   Last Admin: 20 11:49 Dose:  100 ml


Lactulose (Cephulac)  20 gm PO ONETIME ONE


   Stop: 20 10:16


Lorazepam (Ativan)  0 mg PO ASDIRECTED [facility]; Protocol


Lorazepam (Ativan)  0 mg IV ASDIRECTED [facility]; Protocol


Lorazepam (Ativan)  1 mg PO ONETIME ONE


   Stop: 20 14:30


   Last Admin: 20 15:00 Dose:  1 mg


Ursodiol 250 Mg Tab  0 each PO TIDMEALS [facility]


   Last Admin: 20 06:43 Dose:  1 each


Ondansetron HCl (Zofran)  4 mg IVPUSH ONETIME ONE


   Stop: 20 15:26


   Last Admin: 20 15:42 Dose:  4 mg


Potassium Chloride (Klor-Con M20)  20 meq PO ONETIME ONE


   Stop: 20 21:08


   Last Admin: 20 21:34 Dose:  20 meq


Quetiapine Fumarate (Seroquel)  25 mg PO BEDTIME [facility]


   Last Admin: 20 21:29 Dose:  25 mg


Sodium Chloride (Saline Flush)  10 ml FLUSH ONETIME ONE


   Stop: 20 11:45


   Last Admin: 20 11:49 Dose:  10 ml


Topiramate (Topamax)  25 mg PO BID [facility]


   Last Admin: 20 21:29 Dose:  25 mg


Ursodiol (Mat 250)  500 mg .ROUTE .STK-MED ONE


   Stop: 20 17:01











- Exam


General: Alert, Oriented, Cooperative


HEENT: Pupils Equal, Pupils Reactive, Other (His pupils appears jaundiced)


Neck: Supple, Trachea Midline, No JVD


Lungs: Clear to Auscultation, Normal Respiratory Effort


Cardiovascular: Regular Rate, Regular Rhythm


GI/Abdominal Exam: Normal Bowel Sounds, Soft, Non-Tender


Back Exam: Normal Inspection, Full Range of Motion


Extremities: Pedal Edema (Leg swellings noted bilaterally)


Skin: Warm, Dry, Intact


Neurological: No New Focal Deficit, Normal Speech, Normal Tone


Psy/Mental Status: Alert, Normal Affect, Normal Mood





Sepsis Event Note





- Evaluation


Sepsis Screening Result: No Definite Risk





- Focused Exam


Vital Signs: 


 Vital Signs











  Temp Temp Pulse Pulse Resp BP BP


 


 20 09:26   98.0 F  100  100  18  106/72  92/71


 


 20 04:07  97.5 F   101 H   16  120/87 














  Pulse Ox


 


 20 09:26  98


 


 20 04:07  99











Date Exam was Performed: 20


Time Exam was Performed: 10:36





- Problem List Review


Problem List Initiated/Reviewed/Updated: Yes





- My Orders


Last 24 Hours: 


My Active Orders





20 10:31


Albumin 25% 50 ML IVPB Over 30 Min Albumin 25% [Flexbumin 25%] 12.5 gm in 50 ml 

IV ONETIME 





20 10:33


Albumin 25% 50 ML IVPB Over 30 Min Albumin 25% [Flexbumin 25%] 12.5 gm in 50 ml 

IV ONETIME 





20 10:35


Potassium Chloride [Klor-Con M20]   40 meq PO ONETIME ONE 





20 05:11


AMMONIA VENOUS [CHEM] Routine 


CBC WITH AUTO DIFF [HEME] AM 


CMP [COMPREHENSIVE METABOLIC PN,CMP] [CHEM] AM 


LIPASE [CHEM] Routine 


MAGNESIUM (PHARM SOLN) [CHEM] DAILY 


PHOSPHORUS [CHEM] AM 





20 05:11


CMP [COMPREHENSIVE METABOLIC PN,CMP] [CHEM] AM 





20 05:11


CMP [COMPREHENSIVE METABOLIC PN,CMP] [CHEM] AM 














- Assessment


Assessment:: 





Assessment and plan:





liver cirrhosis :, The patient's liver enzymes still appears to be modestly 

elevated, AST//291, 225/120 as compared to 242/129 yesterday, total 

bilirubin 11.6 as compared to 11.3 yesterday.  Still appears jaundiced though 

jaundice appears to be improving.  Plan is to check the patient's ammonia level 

tomorrow morning in the meantime, continue gentle hydration with D5 half-normal 

saline KCl at rate of 75 mL/h the patient will be given lactulose grams x1 dose 

now trying to improve patient's hyperbilirubinemia.  Meanwhile continue 

ursodiol 250 mg p.o. 3 times daily patient will also be on propanolol 5 mg p.o. 

twice daily hold for systolic blood pressure less than 90 heart rate less than 

60.  To encourage physical therapy.





Acute on Chronic pancreatitis:  has been tolerating meals without any acute 

complaints.  Will check the patient's lipase, continue gentle hydration as 

described above.





Hypoalbuminemia: The patient will be given 2,  25 g of albumin given the patient

's albumin today is 2.2 continue to monitor and replace.





History of Alcohol abuse: Patient will continue on thiamine multivitamin and 

folic acid so far no signs of alcohol withdrawals.





Debility/deconditioning: The patient still appears weak, recommendation is for 

patient to continue with physical therapy for strengthening.





Electrolyte imbalance: Patient's potassium and magnesium will be closely 

monitored today, we will give the patient potassium 40 mEq p.o. x1 dose.





Depression: Continue Prozac 20 mg p.o. daily.





Nausea: Zofran 4 mg as needed nausea.





Nicotine dependence: Patient will continue on nicotine patch to be applied daily

, will dose from 14 to 21 mg.





Left leg syndrome: Patient will be started on Requip 1 mg nightly.





Insomnia: Trazodone 100 mg p.o. nightly.














- Plan


Plan:: 





DAY OF ADMISSION


- Ethanol level zero on admission + binge alcohol drinking pattern


   - Admitted with [facility] protocol


- No need for steroid use due to Maddrey score of 8


- Negative Tylenol and salicylate level


- Positive UDS for THC


- Normal coagulation studies, no need for vitamin K


- Normal kidney function


- Smoked 0.5ppd


- Depression


   - Worsening depression as per patient


   - Previously treated with paroxetine, he discontinued due to sexual 

dysfunction


   - Denies any suicidal or homicidal ideation


   - Denies any previous psychiatric admissions





DAY 1


- Evaluated by psychiatry 


   - Recommended initiating Seroquel, Topamax, Prozac and Ativan as needed


   - Outpatient rehabilitation


- Complete abdominal US with mas in pancreatic head recommending triple phase 

CT 


- Electrolytes continue to be abnormal, will replace


- Liver function tests still significantly elevate but trending down


- Patient remains NPO


- Afebrile


- CT imaging with pancreatic head mass possibly in ampulla of vater


Case discussed with [facility] in [place], [place]--> 


States that the findings on pancreatic head appear to be same density of the 

rest of the pancreas and does not appear to be a mas


There is some stranding around the pancreas which represents acute edema and 

stated patient likely is having acute pancreatitis without pain


Recommended outpatient endoscopic ultrasound 4 weeks after resolution of 

current clinical picture


DAY 2


- LFTs trending down


- Pancreatic enzymes trending down


- No pain


- Is hungry


- Afebrile


- CIWA negative


Day 3


* Increasing confusion and hallucination overnight


* Required significant Ativan and 5 mg of Haldol due to hallucination, confusion

, and concern for self-harm.


* Transfer to ICU for close monitoring and one-on-one care.


* Lipase decreasin down to 854


* Amylase stable at 119


* Bilirubin increased to 14, liver enzymes decreased from admission


* PT and INR slightly elevated at 13.1 and 1.22 respectively


* Patient n.p.o. but will advance diet as tolerated once his mental status 

improves


* Ammonia level is normal at 28.  This makes it less likely to be hepatic 

encephalopathy.


* Phosphorus 0.9


* Alcoholic encephalopathy


* Left eye matted and conjunctiva is erythematous


Day 4


* Significantly improved mentation today.  Did not receive any more Ativan 

yesterday afternoon or evening.


* Patient denies having any abdominal pain.  


* Elevated lipase likely secondary to either the pancreatic mass or possibly 

chronic pancreatitis.


* Start a low residual low-fat diet today.


* Total bilirubin 12.6, direct 10.6, GGT 1519, , , alkaline 

phosphatase 368,


* Phosphorus 2.1 and potassium 3.5


Day 5


* Total bilirubin 13.0, direct 11.1, GGT 1560, , , alkaline 

phosphatase 414


* NPO now until after MRCP


* Contacted [facility] one-call - discussed patient with GI. Will order MRCP 

at their recommendation. 


* Repeat labs today


* Start daily PO phosphorous


* Phosphorous 2.2


Day 6


* Feeling better today and stronger


* PT/OT recommending home independent


* Labs improving: INR is now 1.8, direct bilirubin 9.8, GGT 1212, , ALT 

129, alk phos 369, lipase is 801, albumin 2.1


* Magnesium 1.7 - supplement


* Ambulate


* Monitor pedal edema - may require albumin and lasix tomorrow 


* MRCP negative for any obstruction. Pancreatic mass not visualized 





Acute alcoholic hepatitis


Alcohol abuse, binge pattern


Hepatomegaly


Pancreatitis


- Repeat labs in AM


- Thiamine and folic acid supplementation


- [facility] protocol


- Viral hepatitis panel, negative


- Low fat, low fiber diet


- Awaiting results of MRCP





Hypokalemia -resolved


Hypophosphatemia - resolved 


Hyponatremia - Stable


Hypomagnesemia 


-Supplement magnesium now 


-Continue daily phosphorous supplementation





Current every day smoker


- Nicotine patch 14mg





Depression


- Thiamine, folic acid


- Stop Seroquel.  Patient had significant confusion last night and Seroquel 

could exacerbate that.


- Stop Topamax.  Also stopped Topamax secondary to possibility of worsening 

confusion.


- Start Prozac


- Will need psychiatry follow-up





Hypoalbuminemia


Weight loss


- Dietary consult





Conjunctivitis, left


-Warm compresses 3 times daily


-Polytrim eyedrops 1 drop every 4 hours while awake





PROPHYLAXIS


DVT- compression stockings


GI- pantoprazole





CODE STATUS: FULL CODE





DISPOSITION:


Patient will be admitted for supportive care and monitorization of liver 

function. 





SOCIAL:


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





Ultrasound abdomen: 


1.  Abnormal pancreas suggesting the possibility of chronic pancreatitis.  

Questionable mass within the mid pancreas measuring 2.2 cm.  Three-phase 

contrast-enhanced CT of the pancreas is recommended to further evaluate.


2.  Sludge within the gallbladder.


3.  Fatty infiltration within the liver with mild hepatomegaly.





CT of the abdomen and pelvis with and without contrast: 


1.  Findings suspicious for nonspecific colitis as described above.


2.  Focal area of poor enhancement within the head and uncinate process of the 

pancreas.  This could represent a focal area of pancreatitis as well as a small 

mass which occurs near the ampulla of Vater.  Masses felt more likely.  

Recommend referral to gastroenterologist for opinion whether biopsy or ERCP can 

be performed.  This finding would be difficult to biopsy by percutaneous 

technique.  Also, please correlate if patient has elevated amylase and lipase 

to indicate pancreatitis.


3.  Diffuse fatty infiltration within the liver with hepatomegaly.





MRCP:


1.  Small amount of ascites around the liver and spleen and pancreas are seen.  

These findings raise the question of pancreatitis.  Previous mass within the 

pancreas not seen on current exam although no contrast was utilized to confirm.

  Previous mass may represent change from pancreatitis rather than actual 

pancreatic mass.  Follow-up contrast study by CT recommended in 6 months to 

confirm disappearance.


2.  Fatty infiltration within the liver.

## 2020-04-26 NOTE — PCM.PN
- General Info


Date of Service: 04/26/20





- Review of Systems


Systems Review Comment:: 





The patient was seen and examined by me, discussed with Dr. Denney.  At time 

of exam, patient resting in bed.  Today, patient reports no abdominal pain, 

overnight patient has had multiple bowel movements.  This morning, jaundice 

though still present appears to be improving.  No fevers no chills.  Patient 

eating and drinking appropriately.  No reports of chest pain or palpitations.  

Nursing staff did not report any acute concerns with the patient overnight.





- Patient Data


Vitals - Most Recent: 


 Last Vital Signs











Temp  98.1 F   04/26/20 08:55


 


Pulse  103 H  04/26/20 09:10


 


Resp  13   04/26/20 08:55


 


BP  114/63   04/26/20 09:10


 


Pulse Ox  97   04/26/20 08:55











Weight - Most Recent: 159 lb


I&O - Last 24 Hours: 


 Intake & Output











 04/25/20 04/26/20 04/26/20





 22:59 06:59 14:59


 


Intake Total 2925 2005 


 


Output Total 2125 2850 


 


Balance 800 -845 











Lab Results Last 24 Hours: 


 Laboratory Results - last 24 hr











  04/26/20 04/26/20 04/26/20 Range/Units





  05:41 05:41 05:41 


 


WBC  6.67    (4.23-9.07)  K/mm3


 


RBC  2.62 L    (4.63-6.08)  M/mm3


 


Hgb  9.8 L    (13.7-17.5)  gm/dl


 


Hct  29.8 L    (40.1-51.0)  %


 


MCV  113.7 H D    (79.0-92.2)  fl


 


MCH  37.4 H    (25.7-32.2)  pg


 


MCHC  32.9    (32.2-35.5)  g/dl


 


RDW Std Deviation  63.8 H    (35.1-43.9)  fL


 


Plt Count  134 L    (163-337)  K/mm3


 


MPV  11.9    (9.4-12.3)  fl


 


Neut % (Auto)  60.4    (34.0-67.9)  %


 


Lymph % (Auto)  17.8 L    (21.8-53.1)  %


 


Mono % (Auto)  19.0 H    (5.3-12.2)  %


 


Eos % (Auto)  1.2    (0.8-7.0)  


 


Baso % (Auto)  0.6    (0.1-1.2)  %


 


Neut # (Auto)  4.02    (1.78-5.38)  K/mm3


 


Lymph # (Auto)  1.19 L    (1.32-3.57)  K/mm3


 


Mono # (Auto)  1.27 H    (0.30-0.82)  K/mm3


 


Eos # (Auto)  0.08    (0.04-0.54)  K/mm3


 


Baso # (Auto)  0.04    (0.01-0.08)  K/mm3


 


Manual Slide Review  Abnormal smear    


 


Sodium   134 L   (136-145)  mEq/L


 


Potassium   3.9   (3.5-5.1)  mEq/L


 


Chloride   99   ()  mEq/L


 


Carbon Dioxide   22   (21-32)  mEq/L


 


Anion Gap   16.9 H   (5-15)  


 


BUN   5 L   (7-18)  mg/dL


 


Creatinine   0.5 L   (0.7-1.3)  mg/dL


 


Est Cr Clr Drug Dosing   204.34   mL/min


 


Estimated GFR (MDRD)   > 60   (>60)  mL/min


 


BUN/Creatinine Ratio   10.0 L   (14-18)  


 


Glucose   101   ()  mg/dL


 


Calcium   8.6   (8.5-10.1)  mg/dL


 


Phosphorus   2.4 L   (2.6-4.7)  mg/dL


 


Total Bilirubin   10.8 H   (0.2-1.0)  mg/dL


 


AST   177 H   (15-37)  U/L


 


ALT   97 H   (16-63)  U/L


 


Alkaline Phosphatase   320 H   ()  U/L


 


Ammonia    36 H  (11-32)  umol/L


 


Total Protein   5.6 L   (6.4-8.2)  g/dl


 


Albumin   2.3 L   (3.4-5.0)  g/dl


 


Globulin   3.3   gm/dL


 


Albumin/Globulin Ratio   0.7 L   (1-2)  


 


Lipase   815 H   ()  U/L











Med Orders - Current: 


 Current Medications





Fluoxetine HCl (Prozac)  20 mg PO DAILY [facility]


   Last Admin: 04/26/20 09:10 Dose:  20 mg


Folic Acid (Folic Acid)  1 mg PO DAILY [facility]


   Last Admin: 04/26/20 09:10 Dose:  1 mg


Gabapentin (Neurontin)  200 mg PO BEDTIME [facility]


Potassium Chloride/Dextrose/Sod Cl (D5 1/2 Ns W/ 20 Meq/L Kcl)  1,000 mls @ 75 

mls/hr IV ASDIRECTED [facility]


   Last Admin: 04/26/20 01:28 Dose:  75 mls/hr


Potassium Phosphate 30 mmole/ (Sodium Chloride)  510 mls @ 102 mls/hr IV 

ASDIRECTED [facility]


   Stop: 04/26/20 13:14


   Last Admin: 04/26/20 11:06 Dose:  102 mls/hr


Lactulose (Cephulac)  20 gm PO DAILY [facility]


Lorazepam (Ativan)  1 - 3 mg IV ASDIRECTED [facility]; Protocol


   Last Admin: 04/21/20 03:15 Dose:  2 mg


Lorazepam (Ativan)  1 - 3 mg PO ASDIRECTED [facility]; Protocol


   Last Admin: 04/21/20 01:07 Dose:  2 mg


Nicotine (Habitrol)  21 mg TRDERM DAILY [facility]


   Last Admin: 04/26/20 11:20 Dose:  21 mg


Ondansetron HCl (Zofran Odt)  4 mg PO Q6H PRN


   PRN Reason: nausea, able to take PO


Ondansetron HCl (Zofran)  4 mg IV Q6H PRN


   PRN Reason: Nausea/Vomiting


Polymyxin/Trimethoprim Sulfate (Polytrim Ophth Soln)  1 ml EYEBOTH Q4HWA [facility]


   Last Admin: 04/26/20 09:00 Dose:  1 drop


Propranolol HCl (Inderal)  5 mg PO BID [facility]


   Last Admin: 04/26/20 09:10 Dose:  5 mg


Sodium Phosphate (Neutra-Phos)  250 mg PO QID [facility]


   Last Admin: 04/26/20 12:02 Dose:  250 mg


Thiamine HCl (Vitamin B-1)  100 mg PO DAILY [facility]


   Last Admin: 04/26/20 09:10 Dose:  100 mg


Trazodone HCl (Trazodone)  100 mg PO BEDTIME [facility]


   Last Admin: 04/25/20 21:36 Dose:  100 mg


Ursodiol (Mat 250)  250 mg PO TIDMEALS [facility]


   Last Admin: 04/26/20 12:02 Dose:  250 mg





Discontinued Medications





Acetaminophen (Tylenol)  650 mg PO NOW ONE


   Stop: 04/18/20 16:52


   Last Admin: 04/18/20 17:54 Dose:  Not Given


Cyanocobalamin (Vitamin B12)  1,000 mcg IM ONETIME ONE


   Stop: 04/18/20 15:26


   Last Admin: 04/18/20 15:52 Dose:  1,000 mcg


Folic Acid (Folic Acid)  1 mg IV ONETIME ONE


   Stop: 04/19/20 15:26


Folic Acid (Folic Acid)  1 mg IV DAILY [facility]


   Last Admin: 04/20/20 09:44 Dose:  1 mg


Haloperidol Lactate (Haldol)  5 mg IM ONETIME ONE


   Stop: 04/21/20 02:16


   Last Admin: 04/21/20 02:23 Dose:  5 mg


Haloperidol Lactate (Haldol) Confirm Administered Dose 5 mg .ROUTE .STK-MED ONE


   Stop: 04/21/20 02:19


   Last Admin: 04/21/20 02:24 Dose:  Not Given


Lactated Ringer's (Ringers, Lactated)  1,000 mls @ 999 mls/hr IV .BOLUS ONE


   Stop: 04/18/20 16:25


   Last Admin: 04/18/20 15:42 Dose:  999 mls/hr


Lactated Ringer's (Ringers, Lactated)  1,000 mls @ 999 mls/hr IV .BOLUS ONE


   Stop: 04/18/20 16:27


   Last Admin: 04/18/20 16:44 Dose:  999 mls/hr


Potassium Chloride/Sodium Chloride (Normal Saline With 20 Meq Kcl)  1,000 mls @ 

150 mls/hr IV ASDIRECTED [facility]


   Last Admin: 04/18/20 17:34 Dose:  150 mls/hr


Sodium Chloride (Normal Saline)  1,000 mls @ 150 mls/hr IV ASDIRECTED [facility]


   Last Admin: 04/19/20 15:01 Dose:  150 mls/hr


Lactated Ringer's (Ringers, Lactated)  1,000 mls @ 250 mls/hr IV ASDIRECTED [facility]


Magnesium Sulfate 4 gm/ Premix  50 mls @ 12.5 mls/hr IV ONETIME ONE


   Stop: 04/18/20 22:52


   Last Admin: 04/18/20 20:32 Dose:  12.5 mls/hr


Potassium Chloride 10 meq/ (Premix)  100 mls @ 100 mls/hr IV Q1H [facility]


   Stop: 04/18/20 20:59


   Last Admin: 04/18/20 22:04 Dose:  100 mls/hr


Sodium Chloride (Normal Saline)  100 mls @ 60 mls/hr IV ASDIRECTED [facility]


   Stop: 04/19/20 16:00


   Last Admin: 04/19/20 11:49 Dose:  60 mls/hr


Potassium Chloride 10 meq/ (Premix)  100 mls @ 100 mls/hr IV Q1H [facility]


   Stop: 04/19/20 22:44


   Last Admin: 04/19/20 23:39 Dose:  100 mls/hr


Potassium Chloride/Sodium Chloride (Normal Saline With 40 Meq Kcl)  1,000 mls @ 

150 mls/hr IV ASDIRECTED [facility]


   Last Admin: 04/21/20 01:08 Dose:  150 mls/hr


Sodium Phosphate 60 mmole/ (Sodium Chloride)  270 mls @ 67 mls/hr IV ONETIME ONE


   Stop: 04/19/20 23:16


   Last Admin: 04/19/20 20:09 Dose:  67 mls/hr


Magnesium Sulfate 2 gm/ Premix  50 mls @ 25 mls/hr IV ONETIME ONE


   Stop: 04/21/20 10:29


   Last Admin: 04/21/20 09:01 Dose:  25 mls/hr


Sodium Chloride (Normal Saline)  1,000 mls @ 150 mls/hr IV ASDIRECTED [facility]


   Stop: 04/21/20 15:00


   Last Admin: 04/21/20 08:30 Dose:  150 mls/hr


Dextrose/Sodium Chloride (Dextrose 5%-Normal Saline)  1,000 mls @ 125 mls/hr IV 

ASDIRECTED [facility]


   Last Admin: 04/22/20 04:52 Dose:  125 mls/hr


Sodium Phosphate 30 mmole/ (Sodium Chloride)  260 mls @ 130 mls/hr IV Q2H [facility]


   Stop: 04/21/20 14:59


   Last Admin: 04/21/20 13:04 Dose:  130 mls/hr


Magnesium Sulfate 2 gm/ Premix  50 mls @ 25 mls/hr IV Q1H [facility]


   Stop: 04/21/20 22:29


   Last Admin: 04/21/20 22:03 Dose:  25 mls/hr


Potassium Phosphate 15 mmole/ (Sodium Chloride)  255 mls @ 85 mls/hr IV 

ASDIRECTED [facility]


   Stop: 04/21/20 23:29


Sodium Phosphate 30 mmole/ (Sodium Chloride)  260 mls @ 86.667 mls/hr IV 

ONETIME ONE


   Stop: 04/22/20 00:29


   Last Admin: 04/21/20 21:35 Dose:  86.667 mls/hr


Potassium Phosphate 30 mmole/ (Sodium Chloride)  510 mls @ 102 mls/hr IV 

ONETIME ONE


   Stop: 04/22/20 16:29


   Last Admin: 04/22/20 11:06 Dose:  102 mls/hr


Potassium Phosphate 30 mmole/ (Sodium Chloride)  510 mls @ 102 mls/hr IV 

ONETIME ONE


   Stop: 04/23/20 13:59


   Last Admin: 04/23/20 09:00 Dose:  102 mls/hr


Magnesium Sulfate 2 gm/ Premix  50 mls @ 25 mls/hr IV ONETIME ONE


   Stop: 04/24/20 19:50


   Last Admin: 04/24/20 18:14 Dose:  25 mls/hr


Albumin Human (Flexbumin 25%)  12.5 gm in 50 mls @ 100 mls/hr IV ONETIME ONE


   Stop: 04/25/20 11:00


   Last Admin: 04/25/20 11:32 Dose:  100 mls/hr


Albumin Human (Flexbumin 25%)  12.5 gm in 50 mls @ 100 mls/hr IV ONETIME ONE


   Stop: 04/25/20 11:29


   Last Admin: 04/25/20 12:12 Dose:  100 mls/hr


Iopamidol (Isovue-370 (76%))  100 ml IVPUSH ONETIME ONE


   Stop: 04/19/20 11:45


   Last Admin: 04/19/20 11:49 Dose:  100 ml


Lactulose (Cephulac)  20 gm PO ONETIME ONE


   Stop: 04/25/20 10:16


   Last Admin: 04/25/20 11:32 Dose:  20 gm


Lactulose (Cephulac)  20 gm PO BID [facility]


   Last Admin: 04/26/20 09:30 Dose:  Not Given


Lorazepam (Ativan)  0 mg PO ASDIRECTED [facility]; Protocol


Lorazepam (Ativan)  0 mg IV ASDIRECTED [facility]; Protocol


Lorazepam (Ativan)  1 mg PO ONETIME ONE


   Stop: 04/19/20 14:30


   Last Admin: 04/19/20 15:00 Dose:  1 mg


Nicotine (Habitrol)  14 mg TRDERM DAILY [facility]


   Last Admin: 04/25/20 09:44 Dose:  14 mg


Ursodiol 250 Mg Tab  0 each PO TIDMEALS [facility]


   Last Admin: 04/24/20 06:43 Dose:  1 each


Ondansetron HCl (Zofran)  4 mg IVPUSH ONETIME ONE


   Stop: 04/18/20 15:26


   Last Admin: 04/18/20 15:42 Dose:  4 mg


Potassium Chloride (Klor-Con M20)  20 meq PO ONETIME ONE


   Stop: 04/21/20 21:08


   Last Admin: 04/21/20 21:34 Dose:  20 meq


Potassium Chloride (Klor-Con M20)  40 meq PO ONETIME ONE


   Stop: 04/25/20 10:36


   Last Admin: 04/25/20 11:33 Dose:  40 meq


Quetiapine Fumarate (Seroquel)  25 mg PO BEDTIME [facility]


   Last Admin: 04/20/20 21:29 Dose:  25 mg


Ropinirole HCl (Requip)  1 mg PO BEDTIME [facility]


   Last Admin: 04/25/20 21:35 Dose:  1 mg


Sodium Chloride (Saline Flush)  10 ml FLUSH ONETIME ONE


   Stop: 04/19/20 11:45


   Last Admin: 04/19/20 11:49 Dose:  10 ml


Topiramate (Topamax)  25 mg PO BID [facility]


   Last Admin: 04/20/20 21:29 Dose:  25 mg


Ursodiol (Mat 250)  500 mg .ROUTE .STK-MED ONE


   Stop: 04/23/20 17:01











- Exam


General: Alert, Oriented, Cooperative, No Acute Distress


HEENT: Pupils Equal, Pupils Reactive, EOMI, Scleral Icterus (But improving)


Neck: Supple, Trachea Midline, No JVD


Lungs: Clear to Auscultation, Normal Respiratory Effort


Cardiovascular: Regular Rate, Regular Rhythm


GI/Abdominal Exam: Normal Bowel Sounds, Soft, No Distention, Hepatomegaly


Back Exam: Normal Inspection, Full Range of Motion


Extremities: Other (Trace leg swelling is noted bilaterally but improving)


Skin: Warm, Dry, Intact


Neurological: No New Focal Deficit


Psy/Mental Status: Alert, Normal Affect, Normal Mood





Sepsis Event Note





- Evaluation


Sepsis Screening Result: No Definite Risk





- Focused Exam


Vital Signs: 


 Vital Signs











  Temp Pulse Resp BP Pulse Ox


 


 04/26/20 09:10   103 H   114/63 


 


 04/26/20 08:55  98.1 F  103 H  13  114/63  97


 


 04/26/20 03:11  97.5 F  96  18  113/82  100











Date Exam was Performed: 04/26/20


Time Exam was Performed: 12:18





- Problem List Review


Problem List Initiated/Reviewed/Updated: Yes





- My Orders


Last 24 Hours: 


My Active Orders





04/25/20 21:00


Propranolol [Inderal]   5 mg PO BID 


traZODone   100 mg PO BEDTIME 





04/26/20 08:15


Potassium Phosphates 30 mmole   Sodium Chloride 0.9% [Normal Saline] 500 ml IV 

ASDIRECTED 





04/26/20 13:00


Lactulose [Cephulac]   20 gm PO DAILY 





04/26/20 21:00


Gabapentin [Neurontin]   200 mg PO BEDTIME 





04/27/20 05:11


CBC WITH AUTO DIFF [HEME] AM 


CMP [COMPREHENSIVE METABOLIC PN,CMP] [CHEM] AM 


GAMMA GLUTAMYL TRANSFERASE,GGT [CHEM] Routine 


MAGNESIUM (PHARM SOLN) [CHEM] DAILY 





04/28/20 05:11


CMP [COMPREHENSIVE METABOLIC PN,CMP] [CHEM] AM 














- Assessment


Assessment:: 





Assessment and plan:





liver cirrhosis : The patient's liver enzymes still appears to be modestly 

elevated today compared to yesterday.  AST//97 as compared to 225/120 

yesterday, alkaline phosphatase 220 as compared to 368 yesterday.  Ammonia 36 

up from 10 2 days ago.  Bilirubin also trending down 10.2 as compared to 11.6.  

We will continue the patient on lactulose 20 mg p.o. daily, continue current 

with D5 half-normal saline KCl at rate of 75 mL/h, tinea lactulose trying to 

improve patient's hyperbilirubinemia.  Meanwhile continue ursodiol 250 mg p.o. 

3 times daily patient will also be on propanolol 5 mg p.o. twice daily hold for 

systolic blood pressure less than 90 heart rate less than 60.  To encourage 

physical therapy.





Acute on Chronic pancreatitis:  has been tolerating meals without any acute 

complaints.  Lipase today 815, continue gentle hydration as described above.





Hypoalbuminemia: The patient's albumin today 2.2 as compared to 2.3 yesterday 

after transfusion albumin.  will continue to monitor.





History of Alcohol abuse: Patient will continue on thiamine multivitamin and 

folic acid so far no signs of alcohol withdrawals.





Debility/deconditioning: The patient still appears weak, recommendation is for 

patient to continue with physical therapy for strengthening.





Electrolyte imbalance: Patient's potassium and magnesium will be closely 

monitored today, we will give the patient potassium 40 mEq p.o. x1 dose 

continue Neutra-phose.





Depression: Continue Prozac 20 mg p.o. daily.





Nausea: Zofran 4 mg as needed nausea.





Nicotine dependence: Patient will continue on nicotine patch to be applied daily

, will dose from 14 to 21 mg.





Left leg syndrome/neuropathy:, Patient was started on Requip but did not report 

any improvement today.  Will discontinue Requip start Neurontin 200 mg p.o. 

nightly to see if this will help with pain.  





Insomnia: Trazodone 100 mg p.o. nightly.

## 2020-04-27 NOTE — [id number]
**Discharge Summary





- Hospital Course


HPI Initial Comments: 


This is a 39-year-old male with no significant past medical history except for 

alcoholism who comes emergency department complaining of vomiting and weakness 

for approximately 2 weeks.


As per patient he usually has been Christopher drinking that lasts for a couple 

months his last binge went from October to Christmas and he drinks about 10-12 

shots a day of whiskey about 4-5 times a week.


Recently he started drinking in February and up to now in the same frequency.


States that he has been having daily vomiting mostly of fluid content and 

biliary content, denies any blood. Also states that he noticed icteric sclera 

couple days ago as well as dark urine


He also Refers some decreased appetite and weight loss for the past 2 weeks.


Patient says that he has been having these binging episodes for a while now and 

he normally is able to taper himself off; this most recent time he started 

tapering himself off about 2 weeks ago Up to last night where he only had one 

shot of whiskey.


States that he feels he has been getting super depressed mainly within the last 

month. He has a history of depression that started about 2 years ago after he 

was going broke and had to sell everything after that he's been working off and 

on.





COVID 19 risk


- Has been performing social distancing since early February


- Denies any sick contacts


- Denies any fevers or respiratory symptoms


- No recent travel





Diagnosis: Stroke: No





- Discharge Data


Discharge Date: 04/27/20 (Admit date: 4/21/20)


Discharge Disposition: Home, Self-Care 01


Condition: Good





- Referral to Home Health


Primary Care Physician: 


Manan Durant MD








- Discharge Diagnosis/Problem(s)


(1) Hyperbilirubinemia


SNOMED Code(s): 01099801


   ICD Code: E80.6 - OTHER DISORDERS OF BILIRUBIN METABOLISM   Status: Acute   

Priority: High   





(2) Alcohol abuse


SNOMED Code(s): 28303597


   ICD Code: F10.10 - ALCOHOL ABUSE, UNCOMPLICATED   Status: Chronic   Priority

: High   





(3) Alcoholic encephalopathy


SNOMED Code(s): 569726598


   ICD Code: G31.2 - DEGENERATION OF NERVOUS SYSTEM DUE TO ALCOHOL; F10.20 - 

ALCOHOL DEPENDENCE, UNCOMPLICATED   Status: Acute   Priority: High   





(4) Alcoholic hepatitis


SNOMED Code(s): 995578885


   ICD Code: K70.10 - ALCOHOLIC HEPATITIS WITHOUT ASCITES   Status: Acute   

Priority: High   


Qualifiers: 


   Ascites presence: unspecified   Qualified Code(s): K70.10 - Alcoholic 

hepatitis without ascites   





(5) Current every day smoker


SNOMED Code(s): 371015311, 390885089


   ICD Code: F17.200 - NICOTINE DEPENDENCE, UNSPECIFIED, UNCOMPLICATED   Status

: Chronic   Priority: Medium   





(6) Hepatomegaly


SNOMED Code(s): 12103638


   ICD Code: R16.0 - HEPATOMEGALY, NOT ELSEWHERE CLASSIFIED   Status: Chronic   

Priority: High   





(7) Hypoalbuminemia


SNOMED Code(s): 449891484


   ICD Code: E88.09 - OTH DISORDERS OF PLASMA-PROTEIN METABOLISM, NEC   Status: 

Acute   Priority: High   





(8) Hypokalemia


SNOMED Code(s): 86952776


   ICD Code: E87.6 - HYPOKALEMIA   Status: Resolved   Priority: High   





(9) Hypomagnesemia


SNOMED Code(s): 208319845


   ICD Code: E83.42 - HYPOMAGNESEMIA   Status: Resolved   Priority: High   





(10) Hyponatremia


SNOMED Code(s): 59613692


   ICD Code: E87.1 - HYPO-OSMOLALITY AND HYPONATREMIA   Status: Acute   Priority

: High   





(11) Hypophosphatemia


SNOMED Code(s): 2604086


   ICD Code: E83.39 - OTHER DISORDERS OF PHOSPHORUS METABOLISM   Status: Acute 

  Priority: High   





(12) Macrocytic anemia


SNOMED Code(s): 91455497


   ICD Code: D53.9 - NUTRITIONAL ANEMIA, UNSPECIFIED   Status: Acute   Priority

: High   





(13) Marijuana smoker


SNOMED Code(s): 236681841


   ICD Code: F12.90 - CANNABIS USE, UNSPECIFIED, UNCOMPLICATED   Status: 

Chronic   Priority: Medium   





(14) Pancreatic mass


SNOMED Code(s): 793964853


   ICD Code: K86.89 - OTHER SPECIFIED DISEASES OF PANCREAS   Status: Acute   

Priority: High   





(15) Thrombocytopenia


SNOMED Code(s): 974820241


   ICD Code: D69.6 - THROMBOCYTOPENIA, UNSPECIFIED   Status: Acute   Priority: 

High   





(16) Anxiety


SNOMED Code(s): 06445030


   ICD Code: F41.9 - ANXIETY DISORDER, UNSPECIFIED   Status: Chronic   





(17) Degenerative disc disease at L5-S1 level


SNOMED Code(s): 85379046


   ICD Code: M51.36 - OTHER INTERVERTEBRAL DISC DEGENERATION, LUMBAR REGION   

Status: Chronic   Priority: Medium   





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


Status: Chronic   


Qualifiers: 


   Spinal osteoarthritis complication: with radiculopathy   Qualified Code(s): 

M47.26 - Other spondylosis with radiculopathy, lumbar region   





(19) Depression


SNOMED Code(s): 32253824


   ICD Code: F32.9 - MAJOR DEPRESSIVE DISORDER, SINGLE EPISODE, UNSPECIFIED   

Status: Chronic   Priority: Medium   


Qualifiers: 


   Depression Type: other depression   Qualified Code(s): F32.89 - Other 

specified depressive episodes   





(20) Low back pain


SNOMED Code(s): 281068906


   ICD Code: M54.5 - LOW BACK PAIN   Status: Chronic   Priority: Medium   


Qualifiers: 


   Chronicity: unspecified   Back pain laterality: unspecified 





(21)  above reference range


SNOMED Code(s): 82595886953406850


   ICD Code: FIB9358 -    Status: Acute   Priority: High   





(22) Pancreatitis


SNOMED Code(s): 23515512


   ICD Code: K85.90 - ACUTE PANCREATITIS WITHOUT NECROSIS OR INFECTION, UNSP   

Status: Acute   Priority: High   


Qualifiers: 


   Chronicity: acute   Pancreatitis type: alcohol induced   Acute pancreatitis 

complication: unspecified   Qualified Code(s): K85.20 - Alcohol induced acute 

pancreatitis without necrosis or infection   





- Patient Summary/Data


Consults: 


 Consultations





04/19/20 09:30


Consult to Physician [CONS] Routine 





04/19/20 14:30


Consult to Spiritual Care [CONS] Routine 





04/22/20 10:04


OT Evaluation and Treatment [CONS] Routine 


PT Evaluation and Treatment [CONS] Routine 











Labs Pending at D/C: 


CEA and cancer antigen 15-3 still pending 





Recommended Follow-up Testing/Procedures: 


Follow-up with primary care provider within 5-7 days of discharge. 


   -Check CBC, CMP, mag, phosphorus, ammonia, direct bilirubin, Lipase at that 

time.





Follow-up with GI as discussed. 





Follow-up with outpatient psychiatry as directed.





Recommend outpatient addiction counseling. 


Hospital Course: 


Agapito was admitted to the medical surgical floor for assistance with detox. A CT 

of the abdomen and pelvis with and without contrast was obtained showing: "1.  

Findings suspicious for nonspecific colitis as described above. 2.  Focal area 

of poor enhancement within the head and uncinate process of the pancreas.  This 

could represent a focal area of pancreatitis as well as a small mass which 

occurs near the ampulla of Vater.  Masses felt more likely.  Recommend referral 

to gastroenterologist for opinion whether biopsy or ERCP can be performed.  

This finding would be difficult to biopsy by percutaneous technique.  Also, 

please correlate if patient has elevated amylase and lipase to indicate 

pancreatitis. 3.  Diffuse fatty infiltration within the liver with hepatomegaly.

"  Given IV fluids and did see psychiatry who recommended starting Prozac, 

Topamax, and Seroquel.  Also recommended sobriety, AA visit, and pastoral 

guidance.  He would like him to follow-up with outpatient psychiatry after 

discharge.  He was  started on thiamine and folic acid supplementation.  

Unfortunately after a short time on the floor patient began to have episode of 

delirium with hallucinations.  He was then moved to the intensive care unit and 

Haldol and Ativan were given.  Topamax and Seroquel were stopped due to 

concerns over an adverse reaction.  His liver enzymes were noted to be very 

high along with his lipase and it is felt that he has acute on chronic 

pancreatitis as well as liver failure.  Bilirubin was very high and he was 

started on ursodiol 250 mg 3 times daily with meals.  Ammonia increased while 

on the floor and he was started on lactulose 20 g daily. Ultrasound abdomen: "

1.  Abnormal pancreas suggesting the possibility of chronic pancreatitis.  

Questionable mass within the mid pancreas measuring 2.2 cm.  Three-phase 

contrast-enhanced CT of the pancreas is recommended to further evaluate. 2.  

Sludge within the gallbladder. 3.  Fatty infiltration within the liver with 

mild hepatomegaly."  Was discussed multiple times with GI in [place] who 

recommended obtaining an MRCP to ensure no blockage.  MRCP was obtained showing 

: "1.  Small amount of ascites around the liver and spleen and pancreas are 

seen.  These findings raise the question of pancreatitis.  Previous mass within 

the pancreas not seen on current exam although no contrast was utilized to 

confirm.  Previous mass may represent change from pancreatitis rather than 

actual pancreatic mass.  Follow-up contrast study by CT recommended in 6 months 

to confirm disappearance. 2.  Fatty infiltration within the liver."  CA 19-9 

lab test was obtained and was very high at 521.  Continue to recommend patient 

obtain outpatient CT with contrast to ensure no mass.  While here the patient's 

liver enzymes and lipase did trend downward.  He never really had any abdominal 

pain to speak of and his nausea and vomiting did resolve.  He was complaining 

of some diarrhea and this was secondary to lactulose administration.  He will 

be discharged today.  Recommend outpatient follow-up with GI and psychiatric 

services.  He will be discharged on 20 mg p.o. daily Prozac, 20 g daily 

lactulose, 3 times daily with meals 250 mg ursodiol, 1 mg daily folic acid, and 

1 mg daily thiamine supplementation.  On 5 mg twice daily propranolol and this 

will be continued as well.  He is a smoker and we discussed smoking cessation.  

He will be prescribed nicotine patches on discharge.  He was given a list of 

practical resources such as ND quits and his primary care provider for 

assistance with smoking cessation.





While here Agapito was noted to have bilateral conjunctivitis.  He was started on 

Polytrim eyedrops.  These will be continued for 4 more days on discharge.  He 

should follow-up with his primary care provider should symptoms return.





Agapito was discharged today.  Recommend follow-up with primary care provider 

within 5 to 7 days of discharge, sooner if needed.  Recommend recheck CBC, CMP, 

mag, phosphorus, ammonia, lipase, direct bilirubin at that time.  Commend he 

follow-up with outpatient psychiatry and GI as directed.  Recommend he work 

with AA and possibly outpatient rehab.  He was instructed to return to the 

emergency room or contact his primary care provider should symptoms return or 

worsen.





- Patient Instructions


Diet: Heart Healthy Diet


Diet, Other: Low fat, High fiber diet. Avoid spicy foods. 


Activity: As Tolerated, Rest and Relax Today


Showering/Bathing: May Shower


Notify Provider of: Fever, Increased Pain, Nausea and/or Vomiting


Other/Special Instructions: Follow-up with primary care provider within 5-7 

days of discharge, sooner if needed.  Remain sober.  Follow-up with GI as 

directed.  Recommend follow-up CT scan within 6 months.  Follow-up with 

outpatient psychiatry as scheduled.  Recommend you attend AA meetings as 

suggested.  Should symptoms return or worsen contact primary care provider or 

return to the Emergency Department.  We discussed your smoking status. You were 

prescribed nicotine patches. You were also given a list of resources such as ND 

Quits for cessation aids. You can follow-up with your primary care provider for 

this as well.





- Discharge Plan


*PRESCRIPTION DRUG MONITORING PROGRAM REVIEWED*: No


*COPY OF PRESCRIPTION DRUG MONITORING REPORT IN PATIENT PATRICIA: No


Prescriptions/Med Rec: 


FLUoxetine [PROzac] 20 mg PO DAILY #40 tab


Folic Acid 1 mg PO DAILY #20 tablet


Gabapentin [Neurontin] 200 mg PO BEDTIME #20 cap


Lactulose [Cephulac] 20 gm PO DAILY #20 cup


Nicotine [Habitrol] 21 mg TRDERM DAILY #14 patch


Polymyxin B/Trimethoprim [PolyTrim Ophth Soln] 1 ml EYEBOTH Q4HWA 4 Days #1 

bottle


Propranolol [Inderal] 5 mg PO BID #40 tablet


Thiamine [Vitamin B-1] 100 mg PO DAILY #20 tablet


ursodioL [Mat 250] 250 mg PO TIDMEALS #45 tablet


Home Medications: 


 Home Meds





FLUoxetine [PROzac] 20 mg PO DAILY #40 tab 04/27/20 [Rx]


Folic Acid 1 mg PO DAILY #20 tablet 04/27/20 [Rx]


Gabapentin [Neurontin] 200 mg PO BEDTIME #20 cap 04/27/20 [Rx]


Lactulose [Cephulac] 20 gm PO DAILY #20 cup 04/27/20 [Rx]


Nicotine [Habitrol] 21 mg TRDERM DAILY #14 patch 04/27/20 [Rx]


Polymyxin B/Trimethoprim [PolyTrim Ophth Soln] 1 ml EYEBOTH Q4HWA 4 Days #1 

bottle 04/27/20 [Rx]


Propranolol [Inderal] 5 mg PO BID #40 tablet 04/27/20 [Rx]


Thiamine [Vitamin B-1] 100 mg PO DAILY #20 tablet 04/27/20 [Rx]


ursodioL [Mat 250] 250 mg PO TIDMEALS #45 tablet 04/27/20 [Rx]








Oxygen Therapy Mode: Room Air


Patient Handouts:  Alcoholic Liver Disease, Easy-to-Read, Alcoholic Hepatitis, 

Steps to Quit Smoking


Referrals: 


Saad Francisco MD [Physician] - 05/12/20 11:00 am (This appointment is in 

[place] at [facility]. Please come to the East side of the building to 

register. If you need to change the appointment time, please call [phone]. 

This appointment is [facility].)


Manan Durant MD [Primary Care Provider] - 05/12/20 11:00 am (Please register 

by 10:45am.)





- Discharge Summary/Plan Comment


DC Time >30 min.: Yes (45 mins )





- General Info


Date of Service: 04/27/20


Admission Dx/Problem (Free Text: 





Alcoholic Pancreatitis


Functional Status: Reports: Pain Controlled, Tolerating Diet, Ambulating, 

Urinating.  Denies: New Symptoms





- Review of Systems


General: Reports: No Symptoms.  Denies: Fever, Weakness, Fatigue, Malaise, 

Chills


HEENT: Reports: No Symptoms.  Denies: Headaches, Sore Throat


Pulmonary: Reports: No Symptoms.  Denies: Shortness of Breath, Pleuritic Chest 

Pain, Cough, Sputum, Wheezing


Cardiovascular: Reports: No Symptoms.  Denies: Chest Pain, Palpitations, 

Dyspnea on Exertion


Gastrointestinal: Reports: Diarrhea (2/2 lactulose ).  Denies: Abdominal Pain, 

Constipation, Nausea, Vomiting


Genitourinary: Reports: No Symptoms.  Denies: Pain


Musculoskeletal: Reports: No Symptoms


Skin: Reports: No Symptoms.  Denies: Cyanosis


Neurological: Reports: No Symptoms.  Denies: Confusion, Difficulty Walking, 

Gait Disturbance


Psychiatric: Reports: No Symptoms





- Patient Data


Vitals - Most Recent: 


 Last Vital Signs











Temp  98.1 F   04/26/20 20:50


 


Pulse  91   04/27/20 08:56


 


Resp  16   04/27/20 08:35


 


BP  111/79   04/27/20 08:56


 


Pulse Ox  100   04/27/20 08:35











Weight - Most Recent: 158 lb 1.6 oz


I&O - Last 24 hours: 


 Intake & Output











 04/26/20 04/27/20 04/27/20





 22:59 06:59 14:59


 


Intake Total 3052 1500 60


 


Output Total  550 


 


Balance 3052 950 60











Lab Results - Last 24 hrs: 


 Laboratory Results - last 24 hr











  04/27/20 04/27/20 Range/Units





  06:37 06:37 


 


WBC   7.29  (4.23-9.07)  K/mm3


 


RBC   2.90 L  (4.63-6.08)  M/mm3


 


Hgb   10.8 L  (13.7-17.5)  gm/dl


 


Hct   33.1 L  (40.1-51.0)  %


 


MCV   114.1 H  (79.0-92.2)  fl


 


MCH   37.2 H  (25.7-32.2)  pg


 


MCHC   32.6  (32.2-35.5)  g/dl


 


RDW Std Deviation   63.7 H  (35.1-43.9)  fL


 


Plt Count   183  (163-337)  K/mm3


 


MPV   11.7  (9.4-12.3)  fl


 


Neut % (Auto)   62.5  (34.0-67.9)  %


 


Lymph % (Auto)   15.6 L  (21.8-53.1)  %


 


Mono % (Auto)   19.1 H  (5.3-12.2)  %


 


Eos % (Auto)   1.4  (0.8-7.0)  


 


Baso % (Auto)   0.4  (0.1-1.2)  %


 


Neut # (Auto)   4.56  (1.78-5.38)  K/mm3


 


Lymph # (Auto)   1.14 L  (1.32-3.57)  K/mm3


 


Mono # (Auto)   1.39 H  (0.30-0.82)  K/mm3


 


Eos # (Auto)   0.10  (0.04-0.54)  K/mm3


 


Baso # (Auto)   0.03  (0.01-0.08)  K/mm3


 


Manual Slide Review   Abnormal smear  


 


Sodium  132 L   (136-145)  mEq/L


 


Potassium  3.8   (3.5-5.1)  mEq/L


 


Chloride  100   ()  mEq/L


 


Carbon Dioxide  24   (21-32)  mEq/L


 


Anion Gap  11.8   (5-15)  


 


BUN  5 L   (7-18)  mg/dL


 


Creatinine  0.5 L   (0.7-1.3)  mg/dL


 


Est Cr Clr Drug Dosing  203.19   mL/min


 


Estimated GFR (MDRD)  > 60   (>60)  mL/min


 


BUN/Creatinine Ratio  10.0 L   (14-18)  


 


Glucose  87   ()  mg/dL


 


Calcium  8.6   (8.5-10.1)  mg/dL


 


Total Bilirubin  10.8 H   (0.2-1.0)  mg/dL


 


GGT  900 H   (15-85)  U/L


 


AST  178 H   (15-37)  U/L


 


ALT  92 H   (16-63)  U/L


 


Alkaline Phosphatase  323 H   ()  U/L


 


Total Protein  5.9 L   (6.4-8.2)  g/dl


 


Albumin  2.3 L   (3.4-5.0)  g/dl


 


Globulin  3.6   gm/dL


 


Albumin/Globulin Ratio  0.6 L   (1-2)  











Med Orders - Current: 


 Current Medications





Fluoxetine HCl (Prozac)  20 mg PO DAILY [facility]


   Last Admin: 04/27/20 09:00 Dose:  20 mg


Folic Acid (Folic Acid)  1 mg PO DAILY [facility]


   Last Admin: 04/27/20 08:56 Dose:  1 mg


Gabapentin (Neurontin)  200 mg PO BEDTIME [facility]


   Last Admin: 04/26/20 20:53 Dose:  200 mg


Potassium Chloride/Dextrose/Sod Cl (D5 1/2 Ns W/ 20 Meq/L Kcl)  1,000 mls @ 75 

mls/hr IV ASDIRECTED [facility]


   Last Admin: 04/27/20 05:45 Dose:  75 mls/hr


Lactulose (Cephulac)  20 gm PO DAILY [facility]


   Last Admin: 04/27/20 08:57 Dose:  Not Given


Lorazepam (Ativan)  1 - 3 mg IV ASDIRECTED [facility]; Protocol


   Last Admin: 04/21/20 03:15 Dose:  2 mg


Lorazepam (Ativan)  1 - 3 mg PO ASDIRECTED [facility]; Protocol


   Last Admin: 04/21/20 01:07 Dose:  2 mg


Nicotine (Habitrol)  21 mg TRDERM DAILY [facility]


   Last Admin: 04/27/20 08:59 Dose:  21 mg


Ondansetron HCl (Zofran Odt)  4 mg PO Q6H PRN


   PRN Reason: nausea, able to take PO


Ondansetron HCl (Zofran)  4 mg IV Q6H PRN


   PRN Reason: Nausea/Vomiting


Polymyxin/Trimethoprim Sulfate (Polytrim Ophth Soln)  1 ml EYEBOTH Q4HWA [facility]


   Last Admin: 04/27/20 05:44 Dose:  1 drop


Propranolol HCl (Inderal)  5 mg PO BID [facility]


   Last Admin: 04/27/20 08:56 Dose:  5 mg


Sodium Phosphate (Neutra-Phos)  250 mg PO QID [facility]


   Last Admin: 04/27/20 08:55 Dose:  250 mg


Thiamine HCl (Vitamin B-1)  100 mg PO DAILY [facility]


   Last Admin: 04/27/20 08:56 Dose:  100 mg


Trazodone HCl (Trazodone)  100 mg PO BEDTIME [facility]


   Last Admin: 04/26/20 20:54 Dose:  100 mg


Ursodiol (Mat 250)  250 mg PO TIDMEALS [facility]


   Last Admin: 04/27/20 06:28 Dose:  Not Given





Discontinued Medications





Acetaminophen (Tylenol)  650 mg PO NOW ONE


   Stop: 04/18/20 16:52


   Last Admin: 04/18/20 17:54 Dose:  Not Given


Cyanocobalamin (Vitamin B12)  1,000 mcg IM ONETIME ONE


   Stop: 04/18/20 15:26


   Last Admin: 04/18/20 15:52 Dose:  1,000 mcg


Folic Acid (Folic Acid)  1 mg IV ONETIME ONE


   Stop: 04/19/20 15:26


Folic Acid (Folic Acid)  1 mg IV DAILY [facility]


   Last Admin: 04/20/20 09:44 Dose:  1 mg


Haloperidol Lactate (Haldol)  5 mg IM ONETIME ONE


   Stop: 04/21/20 02:16


   Last Admin: 04/21/20 02:23 Dose:  5 mg


Haloperidol Lactate (Haldol) Confirm Administered Dose 5 mg .ROUTE .STK-MED ONE


   Stop: 04/21/20 02:19


   Last Admin: 04/21/20 02:24 Dose:  Not Given


Lactated Ringer's (Ringers, Lactated)  1,000 mls @ 999 mls/hr IV .BOLUS ONE


   Stop: 04/18/20 16:25


   Last Admin: 04/18/20 15:42 Dose:  999 mls/hr


Lactated Ringer's (Ringers, Lactated)  1,000 mls @ 999 mls/hr IV .BOLUS ONE


   Stop: 04/18/20 16:27


   Last Admin: 04/18/20 16:44 Dose:  999 mls/hr


Potassium Chloride/Sodium Chloride (Normal Saline With 20 Meq Kcl)  1,000 mls @ 

150 mls/hr IV ASDIRECTED [facility]


   Last Admin: 04/18/20 17:34 Dose:  150 mls/hr


Sodium Chloride (Normal Saline)  1,000 mls @ 150 mls/hr IV ASDIRECTED [facility]


   Last Admin: 04/19/20 15:01 Dose:  150 mls/hr


Lactated Ringer's (Ringers, Lactated)  1,000 mls @ 250 mls/hr IV ASDIRECTED [facility]


Magnesium Sulfate 4 gm/ Premix  50 mls @ 12.5 mls/hr IV ONETIME ONE


   Stop: 04/18/20 22:52


   Last Admin: 04/18/20 20:32 Dose:  12.5 mls/hr


Potassium Chloride 10 meq/ (Premix)  100 mls @ 100 mls/hr IV Q1H [facility]


   Stop: 04/18/20 20:59


   Last Admin: 04/18/20 22:04 Dose:  100 mls/hr


Sodium Chloride (Normal Saline)  100 mls @ 60 mls/hr IV ASDIRECTED [facility]


   Stop: 04/19/20 16:00


   Last Admin: 04/19/20 11:49 Dose:  60 mls/hr


Potassium Chloride 10 meq/ (Premix)  100 mls @ 100 mls/hr IV Q1H [facility]


   Stop: 04/19/20 22:44


   Last Admin: 04/19/20 23:39 Dose:  100 mls/hr


Potassium Chloride/Sodium Chloride (Normal Saline With 40 Meq Kcl)  1,000 mls @ 

150 mls/hr IV ASDIRECTED [facility]


   Last Admin: 04/21/20 01:08 Dose:  150 mls/hr


Sodium Phosphate 60 mmole/ (Sodium Chloride)  270 mls @ 67 mls/hr IV ONETIME ONE


   Stop: 04/19/20 23:16


   Last Admin: 04/19/20 20:09 Dose:  67 mls/hr


Magnesium Sulfate 2 gm/ Premix  50 mls @ 25 mls/hr IV ONETIME ONE


   Stop: 04/21/20 10:29


   Last Admin: 04/21/20 09:01 Dose:  25 mls/hr


Sodium Chloride (Normal Saline)  1,000 mls @ 150 mls/hr IV ASDIRECTED [facility]


   Stop: 04/21/20 15:00


   Last Admin: 04/21/20 08:30 Dose:  150 mls/hr


Dextrose/Sodium Chloride (Dextrose 5%-Normal Saline)  1,000 mls @ 125 mls/hr IV 

ASDIRECTED [facility]


   Last Admin: 04/22/20 04:52 Dose:  125 mls/hr


Sodium Phosphate 30 mmole/ (Sodium Chloride)  260 mls @ 130 mls/hr IV Q2H [facility]


   Stop: 04/21/20 14:59


   Last Admin: 04/21/20 13:04 Dose:  130 mls/hr


Magnesium Sulfate 2 gm/ Premix  50 mls @ 25 mls/hr IV Q1H [facility]


   Stop: 04/21/20 22:29


   Last Admin: 04/21/20 22:03 Dose:  25 mls/hr


Potassium Phosphate 15 mmole/ (Sodium Chloride)  255 mls @ 85 mls/hr IV 

ASDIRECTED [facility]


   Stop: 04/21/20 23:29


Sodium Phosphate 30 mmole/ (Sodium Chloride)  260 mls @ 86.667 mls/hr IV 

ONETIME ONE


   Stop: 04/22/20 00:29


   Last Admin: 04/21/20 21:35 Dose:  86.667 mls/hr


Potassium Phosphate 30 mmole/ (Sodium Chloride)  510 mls @ 102 mls/hr IV 

ONETIME ONE


   Stop: 04/22/20 16:29


   Last Admin: 04/22/20 11:06 Dose:  102 mls/hr


Potassium Phosphate 30 mmole/ (Sodium Chloride)  510 mls @ 102 mls/hr IV 

ONETIME ONE


   Stop: 04/23/20 13:59


   Last Admin: 04/23/20 09:00 Dose:  102 mls/hr


Magnesium Sulfate 2 gm/ Premix  50 mls @ 25 mls/hr IV ONETIME ONE


   Stop: 04/24/20 19:50


   Last Admin: 04/24/20 18:14 Dose:  25 mls/hr


Albumin Human (Flexbumin 25%)  12.5 gm in 50 mls @ 100 mls/hr IV ONETIME ONE


   Stop: 04/25/20 11:00


   Last Admin: 04/25/20 11:32 Dose:  100 mls/hr


Albumin Human (Flexbumin 25%)  12.5 gm in 50 mls @ 100 mls/hr IV ONETIME ONE


   Stop: 04/25/20 11:29


   Last Admin: 04/25/20 12:12 Dose:  100 mls/hr


Potassium Phosphate 30 mmole/ (Sodium Chloride)  510 mls @ 102 mls/hr IV 

ASDIRECTED MEAGHAN


   Stop: 04/26/20 13:14


   Last Admin: 04/26/20 11:06 Dose:  102 mls/hr


Iopamidol (Isovue-370 (76%))  100 ml IVPUSH ONETIME ONE


   Stop: 04/19/20 11:45


   Last Admin: 04/19/20 11:49 Dose:  100 ml


Lactulose (Cephulac)  20 gm PO ONETIME ONE


   Stop: 04/25/20 10:16


   Last Admin: 04/25/20 11:32 Dose:  20 gm


Lactulose (Cephulac)  20 gm PO BID MEAGHAN


   Last Admin: 04/26/20 09:30 Dose:  Not Given


Lorazepam (Ativan)  0 mg PO ASDIRECTED MEAGHAN; Protocol


Lorazepam (Ativan)  0 mg IV ASDIRECTED MEAGHAN; Protocol


Lorazepam (Ativan)  1 mg PO ONETIME ONE


   Stop: 04/19/20 14:30


   Last Admin: 04/19/20 15:00 Dose:  1 mg


Nicotine (Habitrol)  14 mg TRDERM DAILY [facility]


   Last Admin: 04/25/20 09:44 Dose:  14 mg


Ursodiol 250 Mg Tab  0 each PO TIDMEALS [facility]


   Last Admin: 04/24/20 06:43 Dose:  1 each


Ondansetron HCl (Zofran)  4 mg IVPUSH ONETIME ONE


   Stop: 04/18/20 15:26


   Last Admin: 04/18/20 15:42 Dose:  4 mg


Potassium Chloride (Klor-Con M20)  20 meq PO ONETIME ONE


   Stop: 04/21/20 21:08


   Last Admin: 04/21/20 21:34 Dose:  20 meq


Potassium Chloride (Klor-Con M20)  40 meq PO ONETIME ONE


   Stop: 04/25/20 10:36


   Last Admin: 04/25/20 11:33 Dose:  40 meq


Quetiapine Fumarate (Seroquel)  25 mg PO BEDTIME [facility]


   Last Admin: 04/20/20 21:29 Dose:  25 mg


Ropinirole HCl (Requip)  1 mg PO BEDTIME [facility]


   Last Admin: 04/25/20 21:35 Dose:  1 mg


Sodium Chloride (Saline Flush)  10 ml FLUSH ONETIME ONE


   Stop: 04/19/20 11:45


   Last Admin: 04/19/20 11:49 Dose:  10 ml


Topiramate (Topamax)  25 mg PO BID [facility]


   Last Admin: 04/20/20 21:29 Dose:  25 mg


Ursodiol (Mat 250)  500 mg .ROUTE .STK-MED ONE


   Stop: 04/23/20 17:01











- Exam


Quality Assessment: Reports: DVT Prophylaxis


General: Reports: Alert, Oriented, Cooperative, No Acute Distress, Other (

Jaundiced but improving )


HEENT: Reports: Pupils Equal, Pupils Reactive, Scleral Icterus


Neck: Reports: Supple, Trachea Midline


Lungs: Reports: Clear to Auscultation, Normal Respiratory Effort


Cardiovascular: Reports: Regular Rate, Regular Rhythm


GI/Abdominal Exam: Normal Bowel Sounds, Soft, Non-Tender, No Distention


 (Male) Exam: Deferred


Rectal (Males) Exam: Deferred


Back Exam: Reports: Normal Inspection, Full Range of Motion


Extremities: Normal Inspection, Normal Range of Motion, Non-Tender, No Pedal 

Edema, Normal Capillary Refill


Skin: Reports: Warm, Dry, Intact


Neurological: Reports: No New Focal Deficit


Psy/Mental Status: Reports: Alert

## 2020-04-27 NOTE — PN
DATE OF SERVICE:  04/25/2020

 

ADDENDUM:

The patient was seen, examined, and discussed by me with Nader Vidal PA-C.

 

Mr. Flores condition is worrisome.  The patient is eating okay.  He does not

report any abdominal pain or nausea, but he is still very weak and bilirubin

actually came up today from 11.3 to 11.6 and the patient's alk phos  still

remain elevated.  The patient has significant cholestatic hepatitis that is very

resilient to treatment.  We will continue the patient on Ursodiol 250 mg p.o. 3

times a day with meals.  The patient does have 1, sometimes 2 bowel movements a

day, but I think that we can try to enforce passage through the patient's

bowels.  For this, we will start the patient on lactulose.  At least 1 dose 30

mL will be given today.  We will increase dose  further to achieve 4 to 6 bowel

movements per day tomorrow if needed.  Other than this, the patient has profound

hypoalbuminemia of 2.2.  He does have bilateral pedal edema watery, likely

secondary to this hypoalbuminemia, and to improve perfusion and hopefully

promote improvement of the patient's cholestatic hepatitis, we will transfuse 50

g of albumin today.  The patient will stay in-house.

 

For details of the patient's review of systems, interval test results, physical

exam, medications, and further plan of management, please see note prepared by

Nader Vidal PA-C.

 

DD:  04/25/2020 09:33:01

DT:  04/25/2020 09:56:52  NAHN

Job #:  [id number]

## 2020-04-27 NOTE — PN
DATE OF SERVICE:  04/26/2020

 

ADDENDUM:

The patient was seen, examined, and discussed by me with Nader Vidal PA-C.

 

The patient overall was doing well.  He is eating 100% of his meals.  The

patient is jaundiced, but jaundice is getting better.  Today, his bilirubin is

down to 10.8, that is the lowest point since admission.  The patient did have

doses of lactulose and had several bowel movements overnight.  At this moment,

we will continue any other laxatives, but we will continue the patient on

lactulose 30 mL p.o. daily to achieve 3 to 4 loose bowel movements daily.

Ursodiol 250 mg p.o. 3 times a day with meals will be continued.  We will

continue physical therapy.  It looks like the patient's condition is improving

and we expect discharging the patient to alcoholic rehab tomorrow.  Upon

discharge, the patient will be recommended for Ursodiol 250 mg p.o. 3 times a

day with meals for the next at least 1 month, and in 1 month, the patient needs

to follow up the GI/liver specialist for further management of the patient's

severe alcoholic hepatitis/possible early cirrhosis and also making decision

about the need and timing for elective cholecystectomy given the fact that the

patient was found to have dysfunctional gallbladder with sludge on this

admission.

 

Otherwise for details of the patient's review of systems, interval test results,

physical exam, medications, and further plan of management, please see note

prepared by Nader Vidal PA-C.

 

DD:  04/26/2020 10:09:21

DT:  04/26/2020 10:32:20  NHAN

Job #:  [id number]

## 2020-04-28 NOTE — DISCH
ADMISSION DATE:  04/21/2020

DISCHARGE DATE:  04/27/2020

 

ADDENDUM:

 

 

FINAL DIAGNOSIS:

1. Severe acute on top of chronic cholestatic alcoholic hepatitis (improving).

2. Alcoholic encephalopathy (improved).

3. Alcohol abuse.

4. Smoking dependency.

5. Electrolyte imbalance on admission (improved after replacement).

6. Thrombocytopenia secondary to chronic alcoholic hepatitis (stable).

7. Reported history of degenerative joint disease, depression, acute alcohol-

    induced pancreatitis (improved).

 

The patient was seen, examined, and discussed by me with Saji Smith PA-C.

 

HOSPITAL COURSE:

The patient is a 39-year-old white male who was admitted to the hospital on

04/18/2020 from emergency room where the patient presented with suppressed

mental status, nausea, vomiting, and abdominal pain.  He was found to be in

acute recurrent pancreatitis, extremely high bilirubinemia secondary to acute on

top of chronic cholestatic alcoholic hepatitis and also with alcohol

intoxication.  The patient was on alcohol withdrawal program.  He received

modest IV fluids.  Gradually, the patient regained his mental status, but

cholestatic hepatitis and acute on top of chronic pancreatitis were very

resilient to treatment.  Eventually for 3 days, the patient established trend

for improvement.  With added Ursodiol 250 mg p.o. 3 times a day with meals,

bilirubin started going down.  The patient feels well, eats 100% of his meals,

and he will be discharged home in satisfactory condition.  Upon discharge home,

the patient is recommended for mental health counseling and rehab as

appropriate.  The patient is vocalizing understanding about and really wants to

participate in these programs.  Other than this upon discharge in 2 weeks, we

will recommend to follow up with his GI/liver specialist in concern of severe

cholestatic hepatitis that the patient has and hopefully establish regular care.

 

Throughout hospital stay and on the day of discharge, the patient was consulted

about need to stop abusing alcohol and overall drinking immediately.  He

vocalized understanding and hopefully will stop drinking absolutely upon

discharge.  At least, the patient reported that he will definitely try to do so.

Total time spent in alcohol cessation counseling was longer than 15 minutes.

 

Throughout hospital stay and on the day of discharge, the patient was consulted

about smoking cessation, again vocalized understanding and stated that nicotine

patch works well for him, and he is not planning to restart smoking.  Total time

spent in smoking cessation counseling was longer than 3 minutes.

 

For details of the patient's history, clinical presentation, course of hospital

stay, test results, physical exam on discharge, discharge medications and

recommendations, please see discharge summary and discharge instructions

prepared by Saji Smith PA-C.

 

DD:  04/27/2020 12:57:26

DT:  04/27/2020 14:36:46  NHAN

Job #:  [id number]

## 2021-10-31 NOTE — EDM.PDOC
ED HPI GENERAL MEDICAL PROBLEM





- General


Chief Complaint: Back Pain or Injury


Stated Complaint: BACK INJURY


Time Seen by Provider: 10/31/21 18:20


Source of Information: Reports: Patient


History Limitations: Reports: No Limitations





- History of Present Illness


INITIAL COMMENTS - FREE TEXT/NARRATIVE: 





Mr. Flores is a very pleasant 41-year-old gentleman who now presents the ED stat

ing that he developed bilateral lower back pain around 1230 this afternoon, when

he squatted, lifted, and twisted a heavy item at the same time while working at 

home.  He states that the pain is localized to his lower back, and does not 

radiate down either lower extremity.  He denies incontinence of bowel or 

bladder.  He states that he has a history of degenerative disc disease, and that

he has had similar pain in the past, typically treated with oxycodone or Norco. 

He states that he is already on gabapentin.





The patient states that he applied a heating pad to his lower back, but he did 

not take any analgesics prior to coming to the ED.





Here in the ED, the patient's initial BP is found to be mildly elevated at 

149/100, otherwise, he is hemodynamically stable, afebrile, saturating 98% on 

room air.  He appears to be comfortable while lying semirecumbent on the gurney.





Prior to this afternoon, the patient denies having a recent fever, chills, sore 

throat, ear pain, nasal or sinus congestion, cough, dyspnea, chest pain, 

palpitations, nausea, vomiting, constipation, diarrhea, abdominal pain, urinary 

symptoms, recent weight gain or weight loss, recent bloody bowel movements or 

black bowel movements, recent joint aches, headaches, or rashes.








The patient's PCP is Dr. Manan Durant.


His psychology midlevel is Brittany Gilbert NP.


He has not received a COVID vaccination, nor an influenza vaccination this 

season.








  ** Lower Posterior Back


Pain Score (Numeric/FACES): 9





- Related Data


                                    Allergies











Allergy/AdvReac Type Severity Reaction Status Date / Time


 


cephalexin [From Keflex] Allergy  Anaphylactic Verified 10/31/21 17:29





   Shock  











Home Meds: 


                                    Home Meds





FLUoxetine [PROzac] 20 mg PO DAILY #40 tab 04/27/20 [Rx]


Gabapentin [Neurontin] 200 mg PO TID 10/31/21 [History]


Mirtazapine 15 mg PO DAILY 10/31/21 [History]


Orphenadrine [Norflex] 1 tab PO Q12H PRN #14 tab.er 10/31/21 [Rx]


buPROPion HCL [Wellbutrin Xl] 300 mg PO DAILY 10/31/21 [History]











Past Medical History


Cardiovascular History: Reports: Hypertension (untreated)


Gastrointestinal History: Reports: Hepatitis (alcoholic)


Musculoskeletal History: Reports: Back Pain, Chronic (DDD), Other (See Below) 

(Extensive facial trauma as a child)


Psychiatric History: Reports: Addiction (alcohol)





- Infectious Disease History


Infectious Disease History: Reports: Chicken Pox





- Past Surgical History


HEENT Surgical History: Reports: Oral Surgery, Tonsillectomy, Other (See Below) 

(17 facial reconstruction surgeries as a child)





Social & Family History





- Tobacco Use


Tobacco Use Status *Q: Current Every Day Tobacco User


Years of Tobacco use: 24


Packs/Tins Daily: 0.5


Packs/Tins Daily Comment: Down from 2 ppd


Tobacco Use Comment: Started smoking 1997





- Caffeine Use


Caffeine Use: Reports: Coffee


Caffeine Use Comment: one pot daily





- Alcohol Use


Alcohol Use History: Yes


Date/Time of Last Drink Comment: Alcoholic - sober since Apr 2020





- Recreational Drug Use


Recreational Drug Use: Yes


Drug Use in Last 12 Months: Yes


Recreational Drug Type: Reports: Marijuana/Hashish (smokes on occasion - last 

early Oct 2021)





- Living Situation & Occupation


Living situation: Reports:  ([place] with Spouse, with 

Family (4 kids)


Occupation: Employed ()





ED ROS GENERAL





- Review of Systems


Review Of Systems: Comprehensive ROS is negative, except as noted in HPI.





ED EXAM,LOWER BACK PAIN/INJURY





- Physical Exam


Exam: See Below


Exam Limited By: No Limitations


General Appearance: Alert, WD/WN, No Apparent Distress (while lying on the 

gurney)


Eye Exam: Bilateral Eye: EOMI, Normal Inspection


Ears: Normal External Exam, Hearing Grossly Normal


Nose: Normal Inspection


Throat/Mouth: Normal Inspection, Normal Lips, Normal Voice, No Airway Compromise


Head: Atraumatic, Normocephalic


Neck: Normal Inspection, Full Range of Motion


Respiratory/Chest: No Respiratory Distress, Lungs Clear, Normal Breath Sounds, 

No Accessory Muscle Use


Cardiovascular: Normal Peripheral Pulses, Regular Rate, Rhythm, No Edema, No 

Gallop, No JVD, No Murmur, No Rub


GI/Abdominal: Normal Bowel Sounds, Soft, Non-Tender, No Organomegaly, No 

Distention, No Abnormal Bruit, No Mass


Back Exam: Muscle Spasm (bilateral lumbar paraspinous)


Extremities: Normal Inspection, Normal Range of Motion, No Pedal Edema, Normal 

Capillary Refill


Neurological: Alert, Normal Dorsiflexion, Normal Plantar Flexion, No 

Motor/Sensory Deficits, Oriented x 3


Psychiatric: Normal Affect


Skin Exam: Warm, Dry, Intact, Normal Color, No Rash





Course





- Vital Signs


Last Recorded V/S: 


                                Last Vital Signs











Temp  36.0 C L  10/31/21 17:23


 


Pulse  96   10/31/21 17:23


 


Resp  18   10/31/21 17:23


 


BP  149/100 H  10/31/21 17:23


 


Pulse Ox  98   10/31/21 17:23














- Orders/Labs/Meds


Meds: 


Medications














Discontinued Medications














Generic Name Dose Route Start Last Admin





  Trade Name Odinq  PRN Reason Stop Dose Admin


 


Hydromorphone HCl  1 mg  10/31/21 18:48  10/31/21 19:02





  Hydromorphone 1 Mg/Ml Syringe  IM  10/31/21 18:49  1 mg





  ONETIME ONE   Administration


 


Ibuprofen  600 mg  10/31/21 18:47  10/31/21 19:02





  Ibuprofen 600 Mg Tab  PO  10/31/21 18:48  600 mg





  ONETIME ONE   Administration


 


Orphenadrine Citrate  100 mg  10/31/21 18:47  10/31/21 19:02





  Orphenadrine 100 Mg Tab.Er  PO  10/31/21 18:48  100 mg





  ONETIME STA   Administration














- Re-Assessments/Exams


Free Text/Narrative Re-Assessment/Exam: 





10/31/21 18:48


The patient appears to have lower back pain due to a muscle spasm.  His symptoms

 are not consistent with a herniated intervertebral disc, as there is no 

radiculopathy.  He will be started on Norflex and ibuprofen, and given an IM 

injection of Dilaudid here in the ED.  He will be discharged home with an 

[facility]'s prescription for Norco and I will submit a prescription to the 

pharmacy of his choice for Norflex.  He can continue to take OTC ibuprofen.  I 

recommended that he stay active.  If his symptoms persist, he should follow-up 

with Dr. Durant for further evaluation.














Departure





- Departure


Time of Disposition: 18:49


Disposition: Home, Self-Care 01


Condition: Good


Clinical Impression: 


 Spasm of back muscles, Low back pain








- Discharge Information


*PRESCRIPTION DRUG MONITORING PROGRAM REVIEWED*: Not Applicable


*COPY OF PRESCRIPTION DRUG MONITORING REPORT IN PATIENT PATRICIA: Not Applicable


Prescriptions: 


Orphenadrine [Norflex] 1 tab PO Q12H PRN #14 tab.er


 PRN Reason: Muscle Spasm - Painful


Instructions:  Muscle Cramps and Spasms, Easy-to-Read


Referrals: 


Manan Durant MD [Primary Care Provider] - 


Brittany Gilbert NP [Ordering Only Provider] - 


Forms:  ED Department Discharge


Additional Instructions: 


You were seen in the emergency room after straining your lower back while 

working at home this afternoon.





You were treated with an injection of the opioid Dilaudid, started on the muscle

relaxant Norflex, and given ibuprofen in the ER.





We recommend that you take over-the-counter ibuprofen, 3 tablets (600 mg) up to 

every 8 hours, with food, as needed for discomfort.





A prescription for the opioid pain reliever Norco has been provided to you via [facility].  You may take 1 to 2 tablets of Norco up to every 6 hours, as needed 

for pain not relieved by ibuprofen.  If you take Norco, do not drive or operate 

heavy machinery for 12 hours afterwards.  Norco may cause constipation, so 

consider taking a stool softener.





A prescription for Norflex has been sent to [facility].  Take 1 tablet of Norflex every 12 hours, starting tomorrow 

morning, Monday, 11/1/2021.





It is important that you stay active.  Swimming is best, but walking is good, as

well.





If your symptoms persist, please follow-up with your PCP, Dr. Manan Durant, for 

further evaluation.





If any other problems, including worsening of your symptoms, please do not 

hesitate to return to the ER.





Sepsis Event Note (ED)





- Evaluation


Sepsis Screening Result: No Definite Risk





- Focused Exam


Vital Signs: 


                                   Vital Signs











  Temp Pulse Resp BP Pulse Ox


 


 10/31/21 17:23  36.0 C L  96  18  149/100 H  98